# Patient Record
Sex: MALE | Race: WHITE | NOT HISPANIC OR LATINO | URBAN - METROPOLITAN AREA
[De-identification: names, ages, dates, MRNs, and addresses within clinical notes are randomized per-mention and may not be internally consistent; named-entity substitution may affect disease eponyms.]

---

## 2017-03-28 ENCOUNTER — INPATIENT (INPATIENT)
Facility: HOSPITAL | Age: 67
LOS: 0 days | Discharge: HOME | End: 2017-03-29
Attending: NEUROLOGICAL SURGERY

## 2017-06-27 DIAGNOSIS — Z88.5 ALLERGY STATUS TO NARCOTIC AGENT: ICD-10-CM

## 2017-06-27 DIAGNOSIS — Z88.6 ALLERGY STATUS TO ANALGESIC AGENT: ICD-10-CM

## 2017-06-27 DIAGNOSIS — E78.5 HYPERLIPIDEMIA, UNSPECIFIED: ICD-10-CM

## 2017-06-27 DIAGNOSIS — J44.9 CHRONIC OBSTRUCTIVE PULMONARY DISEASE, UNSPECIFIED: ICD-10-CM

## 2017-06-27 DIAGNOSIS — I67.1 CEREBRAL ANEURYSM, NONRUPTURED: ICD-10-CM

## 2017-08-07 ENCOUNTER — OUTPATIENT (OUTPATIENT)
Dept: OUTPATIENT SERVICES | Facility: HOSPITAL | Age: 67
LOS: 1 days | Discharge: HOME | End: 2017-08-07

## 2017-08-07 DIAGNOSIS — Z90.5 ACQUIRED ABSENCE OF KIDNEY: ICD-10-CM

## 2017-08-07 DIAGNOSIS — N18.3 CHRONIC KIDNEY DISEASE, STAGE 3 (MODERATE): ICD-10-CM

## 2017-08-07 DIAGNOSIS — E78.00 PURE HYPERCHOLESTEROLEMIA, UNSPECIFIED: ICD-10-CM

## 2017-08-07 DIAGNOSIS — I67.1 CEREBRAL ANEURYSM, NONRUPTURED: ICD-10-CM

## 2017-08-07 DIAGNOSIS — K21.9 GASTRO-ESOPHAGEAL REFLUX DISEASE WITHOUT ESOPHAGITIS: ICD-10-CM

## 2017-08-07 DIAGNOSIS — N28.1 CYST OF KIDNEY, ACQUIRED: ICD-10-CM

## 2017-09-13 ENCOUNTER — OUTPATIENT (OUTPATIENT)
Dept: OUTPATIENT SERVICES | Facility: HOSPITAL | Age: 67
LOS: 1 days | Discharge: HOME | End: 2017-09-13

## 2017-09-13 DIAGNOSIS — K21.9 GASTRO-ESOPHAGEAL REFLUX DISEASE WITHOUT ESOPHAGITIS: ICD-10-CM

## 2017-09-13 DIAGNOSIS — E78.00 PURE HYPERCHOLESTEROLEMIA, UNSPECIFIED: ICD-10-CM

## 2017-09-13 DIAGNOSIS — I67.1 CEREBRAL ANEURYSM, NONRUPTURED: ICD-10-CM

## 2017-09-13 DIAGNOSIS — R33.9 RETENTION OF URINE, UNSPECIFIED: ICD-10-CM

## 2017-09-15 ENCOUNTER — EMERGENCY (EMERGENCY)
Facility: HOSPITAL | Age: 67
LOS: 0 days | Discharge: HOME | End: 2017-09-15
Admitting: INTERNAL MEDICINE

## 2017-09-15 DIAGNOSIS — Z90.5 ACQUIRED ABSENCE OF KIDNEY: ICD-10-CM

## 2017-09-15 DIAGNOSIS — K21.9 GASTRO-ESOPHAGEAL REFLUX DISEASE WITHOUT ESOPHAGITIS: ICD-10-CM

## 2017-09-15 DIAGNOSIS — I67.1 CEREBRAL ANEURYSM, NONRUPTURED: ICD-10-CM

## 2017-09-15 DIAGNOSIS — R33.9 RETENTION OF URINE, UNSPECIFIED: ICD-10-CM

## 2017-09-15 DIAGNOSIS — Z87.891 PERSONAL HISTORY OF NICOTINE DEPENDENCE: ICD-10-CM

## 2017-09-15 DIAGNOSIS — Z88.5 ALLERGY STATUS TO NARCOTIC AGENT: ICD-10-CM

## 2017-09-15 DIAGNOSIS — E78.00 PURE HYPERCHOLESTEROLEMIA, UNSPECIFIED: ICD-10-CM

## 2017-09-15 DIAGNOSIS — I10 ESSENTIAL (PRIMARY) HYPERTENSION: ICD-10-CM

## 2017-09-21 ENCOUNTER — APPOINTMENT (OUTPATIENT)
Dept: UROLOGY | Facility: CLINIC | Age: 67
End: 2017-09-21
Payer: MEDICARE

## 2017-09-21 VITALS
SYSTOLIC BLOOD PRESSURE: 113 MMHG | WEIGHT: 134 LBS | DIASTOLIC BLOOD PRESSURE: 70 MMHG | BODY MASS INDEX: 22.33 KG/M2 | HEART RATE: 89 BPM | HEIGHT: 65 IN

## 2017-09-21 PROCEDURE — 99213 OFFICE O/P EST LOW 20 MIN: CPT

## 2017-09-28 ENCOUNTER — APPOINTMENT (OUTPATIENT)
Dept: UROLOGY | Facility: CLINIC | Age: 67
End: 2017-09-28
Payer: MEDICARE

## 2017-09-28 VITALS
HEART RATE: 81 BPM | HEIGHT: 65 IN | BODY MASS INDEX: 22.33 KG/M2 | WEIGHT: 134 LBS | DIASTOLIC BLOOD PRESSURE: 73 MMHG | SYSTOLIC BLOOD PRESSURE: 111 MMHG

## 2017-09-28 PROCEDURE — 99213 OFFICE O/P EST LOW 20 MIN: CPT

## 2017-11-10 ENCOUNTER — APPOINTMENT (OUTPATIENT)
Dept: UROLOGY | Facility: CLINIC | Age: 67
End: 2017-11-10
Payer: MEDICARE

## 2017-11-10 VITALS
SYSTOLIC BLOOD PRESSURE: 123 MMHG | HEART RATE: 76 BPM | BODY MASS INDEX: 22.33 KG/M2 | HEIGHT: 65 IN | DIASTOLIC BLOOD PRESSURE: 79 MMHG | WEIGHT: 134 LBS

## 2017-11-10 DIAGNOSIS — J42 UNSPECIFIED CHRONIC BRONCHITIS: ICD-10-CM

## 2017-11-10 DIAGNOSIS — N41.9 INFLAMMATORY DISEASE OF PROSTATE, UNSPECIFIED: ICD-10-CM

## 2017-11-10 PROCEDURE — 99213 OFFICE O/P EST LOW 20 MIN: CPT

## 2018-02-06 ENCOUNTER — OUTPATIENT (OUTPATIENT)
Dept: OUTPATIENT SERVICES | Facility: HOSPITAL | Age: 68
LOS: 1 days | Discharge: HOME | End: 2018-02-06

## 2018-02-06 DIAGNOSIS — R33.9 RETENTION OF URINE, UNSPECIFIED: ICD-10-CM

## 2018-02-06 DIAGNOSIS — N41.9 INFLAMMATORY DISEASE OF PROSTATE, UNSPECIFIED: ICD-10-CM

## 2018-02-22 ENCOUNTER — APPOINTMENT (OUTPATIENT)
Dept: UROLOGY | Facility: CLINIC | Age: 68
End: 2018-02-22
Payer: MEDICARE

## 2018-02-22 VITALS
SYSTOLIC BLOOD PRESSURE: 112 MMHG | BODY MASS INDEX: 21.99 KG/M2 | HEART RATE: 77 BPM | WEIGHT: 132 LBS | DIASTOLIC BLOOD PRESSURE: 68 MMHG | HEIGHT: 65 IN

## 2018-02-22 DIAGNOSIS — R97.20 ELEVATED PROSTATE, SPECIFIC ANTIGEN [PSA]: ICD-10-CM

## 2018-02-22 PROCEDURE — 99213 OFFICE O/P EST LOW 20 MIN: CPT

## 2018-02-22 RX ORDER — TAMSULOSIN HYDROCHLORIDE 0.4 MG/1
0.4 CAPSULE ORAL
Qty: 60 | Refills: 5 | Status: ACTIVE | COMMUNITY
Start: 2017-09-21

## 2018-05-24 ENCOUNTER — OUTPATIENT (OUTPATIENT)
Dept: OUTPATIENT SERVICES | Facility: HOSPITAL | Age: 68
LOS: 1 days | Discharge: HOME | End: 2018-05-24

## 2018-05-24 ENCOUNTER — APPOINTMENT (OUTPATIENT)
Dept: UROLOGY | Facility: CLINIC | Age: 68
End: 2018-05-24

## 2018-05-24 DIAGNOSIS — N63.20 UNSPECIFIED LUMP IN THE LEFT BREAST, UNSPECIFIED QUADRANT: ICD-10-CM

## 2018-05-30 DIAGNOSIS — N62 HYPERTROPHY OF BREAST: ICD-10-CM

## 2018-08-07 ENCOUNTER — LABORATORY RESULT (OUTPATIENT)
Age: 68
End: 2018-08-07

## 2018-08-07 ENCOUNTER — OUTPATIENT (OUTPATIENT)
Dept: OUTPATIENT SERVICES | Facility: HOSPITAL | Age: 68
LOS: 1 days | Discharge: HOME | End: 2018-08-07

## 2018-08-07 DIAGNOSIS — N41.9 INFLAMMATORY DISEASE OF PROSTATE, UNSPECIFIED: ICD-10-CM

## 2018-08-07 DIAGNOSIS — Z00.00 ENCOUNTER FOR GENERAL ADULT MEDICAL EXAMINATION WITHOUT ABNORMAL FINDINGS: ICD-10-CM

## 2018-08-07 DIAGNOSIS — R97.20 ELEVATED PROSTATE SPECIFIC ANTIGEN [PSA]: ICD-10-CM

## 2018-08-07 DIAGNOSIS — R33.9 RETENTION OF URINE, UNSPECIFIED: ICD-10-CM

## 2018-08-18 ENCOUNTER — EMERGENCY (EMERGENCY)
Facility: HOSPITAL | Age: 68
LOS: 0 days | Discharge: HOME | End: 2018-08-19
Attending: EMERGENCY MEDICINE | Admitting: EMERGENCY MEDICINE

## 2018-08-18 VITALS
SYSTOLIC BLOOD PRESSURE: 117 MMHG | HEART RATE: 88 BPM | WEIGHT: 132.06 LBS | RESPIRATION RATE: 18 BRPM | DIASTOLIC BLOOD PRESSURE: 85 MMHG | HEIGHT: 65 IN | OXYGEN SATURATION: 95 % | TEMPERATURE: 97 F
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
## 2018-08-18 DIAGNOSIS — M79.645 PAIN IN LEFT FINGER(S): ICD-10-CM

## 2018-08-18 DIAGNOSIS — M79.89 OTHER SPECIFIED SOFT TISSUE DISORDERS: ICD-10-CM

## 2018-08-18 DIAGNOSIS — F17.200 NICOTINE DEPENDENCE, UNSPECIFIED, UNCOMPLICATED: ICD-10-CM
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
## 2018-08-18 DIAGNOSIS — Z88.5 ALLERGY STATUS TO NARCOTIC AGENT: ICD-10-CM

## 2018-08-18 DIAGNOSIS — M79.646 PAIN IN UNSPECIFIED FINGER(S): ICD-10-CM

## 2018-08-18 DIAGNOSIS — Z79.899 OTHER LONG TERM (CURRENT) DRUG THERAPY: ICD-10-CM
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
## 2018-08-18 NOTE — ED ADULT NURSE NOTE - NSSISCREENINGQ3_ED_A_ED
Detail Level: Detailed Duration Of Freeze Thaw-Cycle (Seconds): 0 Post-Care Instructions: I reviewed with the patient in detail post-care instructions. Patient is to wear sunprotection, and avoid picking at any of the treated lesions. Pt may apply Vaseline to crusted or scabbing areas. Consent: The patient's consent was obtained including but not limited to risks of crusting, scabbing, blistering, scarring, darker or lighter pigmentary change, recurrence, incomplete removal and infection. Render Post-Care Instructions In Note?: no No

## 2018-08-18 NOTE — ED PROVIDER NOTE - NS ED ROS FT
Review of Systems:  	•	CONSTITUTIONAL - no fever, no diaphoresis, no chills  	•	SKIN - no rash  	•	HEMATOLOGIC - no bleeding, no bruising  	•	MUSCULOSKELETAL - + joint paint, + swelling, no redness  	•	NEUROLOGIC - no weakness, no paresthesias,

## 2018-08-18 NOTE — ED PROVIDER NOTE - OBJECTIVE STATEMENT
66 yo M with pmhx of brain aneurysm, kidney cancer s/p left nephrectomy presenting with pain and swelling to left 4th digit of hand after having a car battery fall onto hand earlier today. Patient reports pain with palpation and movement. No redness, nausea, vomiting, dizziness, paresthesias, or weakness.

## 2018-08-18 NOTE — ED ADULT NURSE NOTE - NSIMPLEMENTINTERV_GEN_ALL_ED
Implemented All Universal Safety Interventions:  [place] to call system. Call bell, personal items and telephone within reach. Instruct patient to call for assistance. Room bathroom lighting operational. Non-slip footwear when patient is off stretcher. Physically safe environment: no spills, clutter or unnecessary equipment. Stretcher in lowest position, wheels locked, appropriate side rails in place.

## 2018-08-18 NOTE — ED PROVIDER NOTE - ATTENDING CONTRIBUTION TO CARE
I have personally performed a history and physical exam on this patient and personally directed the management of the patient. Patient presents for evaluation of left 5th finger pain after dropping a car battery on his hand pain is moderate and throbbing in nature worse with movement.  On physical exam no lacerations noted from.  we obtained an xray which Is negative for fracture based on clinical exam he was placed in an ulnar gutter splint and discharged.

## 2018-08-18 NOTE — ED PROVIDER NOTE - PHYSICAL EXAMINATION
VITAL SIGNS: I have reviewed nursing notes and confirm.  CONSTITUTIONAL: Well-developed; well-nourished; in no acute distress.  SKIN: Skin exam is warm and dry, no acute rash.  EXT: +Tenderness to left 5th digit of hand with swelling. No bony deformities. Decreased ROM due to pain however full passive ROM.   NEURO: Alert, oriented. Grossly unremarkable. No focal deficits. Radial pulses 2+ bilaterally.   PSYCH: Cooperative, appropriate.

## 2018-08-23 ENCOUNTER — APPOINTMENT (OUTPATIENT)
Dept: UROLOGY | Facility: CLINIC | Age: 68
End: 2018-08-23
Payer: MEDICARE

## 2018-08-23 VITALS
HEART RATE: 86 BPM | SYSTOLIC BLOOD PRESSURE: 117 MMHG | WEIGHT: 132 LBS | DIASTOLIC BLOOD PRESSURE: 75 MMHG | BODY MASS INDEX: 21.99 KG/M2 | HEIGHT: 65 IN

## 2018-08-23 PROCEDURE — 99213 OFFICE O/P EST LOW 20 MIN: CPT

## 2018-08-23 NOTE — PHYSICAL EXAM
[Abdomen Soft] : soft [Urethral Meatus] : meatus normal [Urinary Bladder Findings] : the bladder was normal on palpation [Scrotum] : the scrotum was normal [Testes Mass (___cm)] : there were no testicular masses [No Prostate Nodules] : no prostate nodules [Skin Color & Pigmentation] : normal skin color and pigmentation [Heart Rate And Rhythm] : Heart rate and rhythm were normal [] : no respiratory distress [Oriented To Time, Place, And Person] : oriented to person, place, and time [Not Anxious] : not anxious [Normal Station and Gait] : the gait and station were normal for the patient's age [No Focal Deficits] : no focal deficits [No Palpable Adenopathy] : no palpable adenopathy [FreeTextEntry1] : dwarfism

## 2018-08-23 NOTE — ASSESSMENT
[FreeTextEntry1] : 69 yo with incomplete emptying- \par \par - PSA in 6 months\par - urine studies\par - renal and bladder u/s

## 2018-08-23 NOTE — LETTER BODY
[Dear  ___] : Dear  [unfilled], [Consult Letter:] : I had the pleasure of evaluating your patient, [unfilled]. [Please see my note below.] : Please see my note below. [Consult Closing:] : Thank you very much for allowing me to participate in the care of this patient.  If you have any questions, please do not hesitate to contact me. [Sincerely,] : Sincerely, [FreeTextEntry3] : Justino Medrano MD, FACS\par

## 2018-08-23 NOTE — HISTORY OF PRESENT ILLNESS
[None] : no symptoms [FreeTextEntry1] : 69 yo with elevated PSA and BPH\par \par on finasteride, Bethanechol and tamsulosin\par \par PSA don 8/2018 was 1.57 (corrects to 3.14)\par \par recently noted ejaculation with intercourse\par no discoloration

## 2018-08-24 PROBLEM — I67.1 CEREBRAL ANEURYSM, NONRUPTURED: Chronic | Status: ACTIVE | Noted: 2018-08-18

## 2018-08-24 PROBLEM — C80.1 MALIGNANT (PRIMARY) NEOPLASM, UNSPECIFIED: Chronic | Status: ACTIVE | Noted: 2018-08-18

## 2018-08-24 PROBLEM — N42.9 DISORDER OF PROSTATE, UNSPECIFIED: Chronic | Status: ACTIVE | Noted: 2018-08-18

## 2019-01-29 ENCOUNTER — LABORATORY RESULT (OUTPATIENT)
Age: 69
End: 2019-01-29

## 2019-01-29 ENCOUNTER — OUTPATIENT (OUTPATIENT)
Dept: OUTPATIENT SERVICES | Facility: HOSPITAL | Age: 69
LOS: 1 days | Discharge: HOME | End: 2019-01-29

## 2019-01-29 DIAGNOSIS — R97.20 ELEVATED PROSTATE SPECIFIC ANTIGEN [PSA]: ICD-10-CM

## 2019-01-29 DIAGNOSIS — N40.1 BENIGN PROSTATIC HYPERPLASIA WITH LOWER URINARY TRACT SYMPTOMS: ICD-10-CM

## 2019-02-01 LAB — BACTERIA UR CULT: ABNORMAL

## 2019-02-03 ENCOUNTER — FORM ENCOUNTER (OUTPATIENT)
Age: 69
End: 2019-02-03

## 2019-02-04 ENCOUNTER — OUTPATIENT (OUTPATIENT)
Dept: OUTPATIENT SERVICES | Facility: HOSPITAL | Age: 69
LOS: 1 days | Discharge: HOME | End: 2019-02-04

## 2019-02-04 DIAGNOSIS — R33.9 RETENTION OF URINE, UNSPECIFIED: ICD-10-CM

## 2019-02-04 DIAGNOSIS — N40.1 BENIGN PROSTATIC HYPERPLASIA WITH LOWER URINARY TRACT SYMPTOMS: ICD-10-CM

## 2019-02-04 DIAGNOSIS — R97.20 ELEVATED PROSTATE SPECIFIC ANTIGEN [PSA]: ICD-10-CM

## 2019-02-21 ENCOUNTER — APPOINTMENT (OUTPATIENT)
Dept: UROLOGY | Facility: CLINIC | Age: 69
End: 2019-02-21
Payer: MEDICARE

## 2019-02-21 DIAGNOSIS — R33.9 RETENTION OF URINE, UNSPECIFIED: ICD-10-CM

## 2019-02-21 DIAGNOSIS — R97.20 ELEVATED PROSTATE, SPECIFIC ANTIGEN [PSA]: ICD-10-CM

## 2019-02-21 PROCEDURE — 99213 OFFICE O/P EST LOW 20 MIN: CPT

## 2019-02-21 RX ORDER — AMOXICILLIN AND CLAVULANATE POTASSIUM 875; 125 MG/1; MG/1
875-125 TABLET, COATED ORAL
Qty: 28 | Refills: 0 | Status: COMPLETED | COMMUNITY
Start: 2017-11-10 | End: 2019-02-21

## 2019-02-22 NOTE — HISTORY OF PRESENT ILLNESS
[None] : None [FreeTextEntry1] : Familia is a 68-year-old male who we have been following for elevated PSA and BPH. Currently, he is managed on finasteride, tamsulosin, and bethanechol with good efficacy and without adverse events.\par \par His most recent PSA, from January 2019, is 1.32 (2.64 corrected) and improved from prior values.\par \par During testing revealed group beta strep without a positive urinalysis. This is currently asymptomatic.\par \par Renal/bladder sonogram, from February 2019, revealed unremarkable right kidney with elevated postvoid residual of 146 cc, and consistent with prior values.\par \par Patient is voiding well however, he is complaining of dry ejaculations that occurred after a traumatic catheterization.

## 2019-02-22 NOTE — ASSESSMENT
[FreeTextEntry1] : This is a 68-year-old male with history of elevated postvoid residual and elevated PSA. Currently he is voiding well on what finasteride and bethanechol. He'll continue therapy and followup in 6 months for review.\par \par Concerning his inability to ejaculate posttraumatic catheterization, I explained there is not much that can be done at this time. We reviewed some medications that may help ejaculation including Benadryl and pseudoephedrine however, he understands that these medications could worsen his urinary retention. \par \par At this time, is elected for observation and will follow up in 6 months for TRUS with prostate for further evaluation, if this is still an issue in 6 months.

## 2019-02-22 NOTE — ADDENDUM
[FreeTextEntry1] : I have seen and Evaluated the patient with AUBREY Quintero\par I agree with the content of his progress note and the plan of care outlined\par \par

## 2019-03-19 ENCOUNTER — OUTPATIENT (OUTPATIENT)
Dept: OUTPATIENT SERVICES | Facility: HOSPITAL | Age: 69
LOS: 1 days | Discharge: HOME | End: 2019-03-19

## 2019-03-19 DIAGNOSIS — Z01.818 ENCOUNTER FOR OTHER PREPROCEDURAL EXAMINATION: ICD-10-CM

## 2019-08-24 ENCOUNTER — OUTPATIENT (OUTPATIENT)
Dept: OUTPATIENT SERVICES | Facility: HOSPITAL | Age: 69
LOS: 1 days | Discharge: HOME | End: 2019-08-24

## 2019-08-24 ENCOUNTER — LABORATORY RESULT (OUTPATIENT)
Age: 69
End: 2019-08-24

## 2019-08-24 DIAGNOSIS — R97.20 ELEVATED PROSTATE SPECIFIC ANTIGEN [PSA]: ICD-10-CM

## 2019-08-26 ENCOUNTER — APPOINTMENT (OUTPATIENT)
Dept: UROLOGY | Facility: CLINIC | Age: 69
End: 2019-08-26
Payer: MEDICARE

## 2019-08-26 VITALS — WEIGHT: 132 LBS | BODY MASS INDEX: 21.99 KG/M2 | HEIGHT: 65 IN

## 2019-08-26 PROCEDURE — 76872 US TRANSRECTAL: CPT

## 2019-11-18 ENCOUNTER — EMERGENCY (EMERGENCY)
Facility: HOSPITAL | Age: 69
LOS: 0 days | Discharge: HOME | End: 2019-11-18
Attending: EMERGENCY MEDICINE | Admitting: EMERGENCY MEDICINE
Payer: MEDICARE

## 2019-11-18 VITALS
HEART RATE: 87 BPM | WEIGHT: 132.06 LBS | DIASTOLIC BLOOD PRESSURE: 76 MMHG | HEIGHT: 65 IN | TEMPERATURE: 96 F | RESPIRATION RATE: 16 BRPM | SYSTOLIC BLOOD PRESSURE: 116 MMHG
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
## 2019-11-18 DIAGNOSIS — Z88.5 ALLERGY STATUS TO NARCOTIC AGENT: ICD-10-CM
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
## 2019-11-18 DIAGNOSIS — M79.603 PAIN IN ARM, UNSPECIFIED: ICD-10-CM

## 2019-11-18 DIAGNOSIS — M25.511 PAIN IN RIGHT SHOULDER: ICD-10-CM

## 2019-11-18 PROCEDURE — 73030 X-RAY EXAM OF SHOULDER: CPT | Mod: 26,RT

## 2019-11-18 PROCEDURE — 73060 X-RAY EXAM OF HUMERUS: CPT | Mod: 26,RT

## 2019-11-18 PROCEDURE — 99283 EMERGENCY DEPT VISIT LOW MDM: CPT

## 2019-11-18 NOTE — ED PROVIDER NOTE - NS ED ROS FT
Constitutional: no fever, chills, no recent weight loss, change in appetite or malaise  Cardiac: No chest pain, SOB or edema.  Respiratory: No cough or respiratory distress  GI: No nausea, vomiting, diarrhea or abdominal pain.  : No dysuria, frequency, urgency or hematuria  MS: + rt shoulder pain with radiatio to rt bicep. no loss of ROM, no weakness  Neuro: No headache or weakness. No LOC.  Skin: No skin rash.  Except as documented in the HPI, all other systems are negative.

## 2019-11-18 NOTE — ED PROVIDER NOTE - ATTENDING CONTRIBUTION TO CARE
69 yr old male hx of chronic artthris right shoulder here for right arm pain. pt reports was holding/lifting heavy piece of furniture. pt felt pain suddently to right bicep. pain worse with mvt no weakness, numbess. on exam full right elbow, no swelling to right upper arm no skin changes right upper arm

## 2019-11-18 NOTE — ED PROVIDER NOTE - CARE PROVIDER_API CALL
Familia Cavazos (MD)  Orthopaedic Surgery  [address] [place] [address]  Phone: [phone]  Fax: [phone]  Follow Up Time:

## 2019-11-18 NOTE — ED PROVIDER NOTE - PATIENT PORTAL LINK FT
You can access the FollowMyHealth Patient Portal offered by [facility] by registering at the following website: [email or web address]. By joining [facility]’s FollowMyHealth portal, you will also be able to view your health information using other applications (apps) compatible with our system.

## 2019-11-18 NOTE — ED PROVIDER NOTE - OBJECTIVE STATEMENT
69 year old M c/o rt sided shoulder pain after trying to move heavy dresser at home. Pain radiates to rt bicep and worse with moving/lifting, better with rest. No other injuries. No decreased rom, decreased sensation, paresthesias, neck pain, headache, skin changes/bruising, redness.

## 2019-11-18 NOTE — ED PROVIDER NOTE - PHYSICAL EXAMINATION
CONSTITUTIONAL: Well-appearing; well-nourished; in no apparent distress.   EYES: PERRL; EOM intact.   NECK: No c spine ttp  CARDIOVASCULAR: Normal S1, S2; no murmurs, rubs, or gallops.   RESPIRATORY: Normal chest excursion with respiration; breath sounds clear and equal bilaterally; no wheezes, rhonchi, or rales.  GI/: Normal bowel sounds; non-distended; non-tender; no palpable organomegaly.   MS: No evidence of trauma or deformity. Full rom of b/l shoulders. Strength equal b/l 5/5 throughout. Radial pulses intact  SKIN: Normal for age and race; warm; dry; good turgor; no apparent lesions or exudate.   NEURO/PSYCH: A & O x 4; grossly unremarkable. mood and manner are appropriate. Grooming and personal hygiene are appropriate. Neurovascular intact. Sensation intact

## 2019-11-18 NOTE — ED ADULT TRIAGE NOTE - CHIEF COMPLAINT QUOTE
Right shoulder and arm pain after moving a furniture happened an hour  and a half ago. Pt. took Advil before coming here

## 2020-02-05 LAB
PSA FREE FLD-MCNC: 27 %
PSA FREE SERPL-MCNC: 0.3 NG/ML
PSA SERPL-MCNC: 1.1 NG/ML

## 2020-02-27 ENCOUNTER — APPOINTMENT (OUTPATIENT)
Dept: UROLOGY | Facility: CLINIC | Age: 70
End: 2020-02-27
Payer: MEDICARE

## 2020-02-27 DIAGNOSIS — N52.9 MALE ERECTILE DYSFUNCTION, UNSPECIFIED: ICD-10-CM

## 2020-02-27 PROCEDURE — 99214 OFFICE O/P EST MOD 30 MIN: CPT

## 2020-02-27 NOTE — PHYSICAL EXAM
[General Appearance - Well Developed] : well developed [General Appearance - Well Nourished] : well nourished [Normal Appearance] : normal appearance [Well Groomed] : well groomed [General Appearance - In No Acute Distress] : no acute distress [Edema] : no peripheral edema [] : no respiratory distress [Respiration, Rhythm And Depth] : normal respiratory rhythm and effort [Oriented To Time, Place, And Person] : oriented to person, place, and time [Exaggerated Use Of Accessory Muscles For Inspiration] : no accessory muscle use [Mood] : the mood was normal [Not Anxious] : not anxious [Affect] : the affect was normal [Normal Station and Gait] : the gait and station were normal for the patient's age [No Focal Deficits] : no focal deficits

## 2020-02-27 NOTE — HISTORY OF PRESENT ILLNESS
[None] : None [FreeTextEntry1] : 69-year-old male who we have been following for elevated PSA and BPH. Currently, he is managed on finasteride, tamsulosin, and bethanechol with good efficacy and without adverse events.\par \par His most recent PSA, from Feb 2020 , is 1.10 (2.2 corrected) and improved from prior values.\par \par Renal/bladder sonogram, from February 2019, revealed unremarkable right kidney with elevated postvoid residual of 146 cc, and consistent with prior values.\par \par Patient is voiding well however, he is complaining of dry ejaculations that occurred after a traumatic catheterization.

## 2020-02-27 NOTE — ASSESSMENT
[FreeTextEntry1] : This is a 69-year-old male with history of elevated postvoid residual and elevated PSA. Currently he is voiding well on what finasteride and bethanechol. He'll continue therapy and followup in 6 months for review.\par \par - cialis 5 mg po prn (full R+B explained\par no contraindications)\par - renal and bladder US\par - f/u in 6 months (will review and consider stopping finasteride)

## 2020-06-08 ENCOUNTER — LABORATORY RESULT (OUTPATIENT)
Age: 70
End: 2020-06-08

## 2020-06-29 ENCOUNTER — RX RENEWAL (OUTPATIENT)
Age: 70
End: 2020-06-29

## 2020-07-23 ENCOUNTER — RX RENEWAL (OUTPATIENT)
Age: 70
End: 2020-07-23

## 2020-08-27 ENCOUNTER — APPOINTMENT (OUTPATIENT)
Dept: UROLOGY | Facility: CLINIC | Age: 70
End: 2020-08-27
Payer: MEDICARE

## 2020-08-27 VITALS — WEIGHT: 132 LBS | HEIGHT: 65 IN | BODY MASS INDEX: 21.99 KG/M2 | TEMPERATURE: 98.5 F

## 2020-08-27 PROCEDURE — 99213 OFFICE O/P EST LOW 20 MIN: CPT

## 2020-09-01 NOTE — ASSESSMENT
[FreeTextEntry1] : This is a 70-year-old male with history of elevated postvoid residual and elevated PSA. Currently he is voiding well on flomax and bethanechol. he had been on finasteride but we stoppped it today - erectile dysfunction\par He'll continue therapy and followup in 6 months for review.\par \par - cialis 10 mg po prn (full R+B explained no contraindications)\par - renal and bladder US\par - f/u in 6 months erlin aguilera   daughter

## 2020-09-01 NOTE — HISTORY OF PRESENT ILLNESS
[FreeTextEntry1] : 70-year-old male who we have been following for elevated PSA and BPH. Currently, he is managed on finasteride, tamsulosin, and bethanechol with good efficacy and without adverse events.\par \par His most recent PSA, 6/2020 1.67 (3.34 corrects)\par \par from Feb 2020 , is 1.10 (2.2 corrected) and improved from prior values.\par \par Renal/bladder sonogram, from February 2019, revealed unremarkable right kidney with elevated postvoid residual of 146 cc, and consistent with prior values.\par \par Patient is voiding well however, he is complaining of dry ejaculations that occurred after a traumatic catheterization. [None] : None

## 2020-09-14 RX ORDER — TADALAFIL 5 MG/1
5 TABLET ORAL
Qty: 6 | Refills: 0 | Status: ACTIVE | COMMUNITY
Start: 2020-02-27 | End: 1900-01-01

## 2020-10-21 RX ORDER — TADALAFIL 5 MG/1
5 TABLET ORAL
Qty: 30 | Refills: 5 | Status: ACTIVE | COMMUNITY
Start: 2020-10-21 | End: 1900-01-01

## 2020-12-04 ENCOUNTER — LABORATORY RESULT (OUTPATIENT)
Age: 70
End: 2020-12-04

## 2020-12-06 ENCOUNTER — OUTPATIENT (OUTPATIENT)
Dept: OUTPATIENT SERVICES | Facility: HOSPITAL | Age: 70
LOS: 1 days | Discharge: HOME | End: 2020-12-06
Payer: MEDICARE

## 2020-12-06 DIAGNOSIS — R97.20 ELEVATED PROSTATE SPECIFIC ANTIGEN [PSA]: ICD-10-CM

## 2020-12-06 DIAGNOSIS — R33.9 RETENTION OF URINE, UNSPECIFIED: ICD-10-CM

## 2020-12-06 PROCEDURE — 76770 US EXAM ABDO BACK WALL COMP: CPT | Mod: 26

## 2021-02-25 ENCOUNTER — APPOINTMENT (OUTPATIENT)
Dept: UROLOGY | Facility: CLINIC | Age: 71
End: 2021-02-25
Payer: MEDICARE

## 2021-02-25 VITALS — HEIGHT: 65 IN | BODY MASS INDEX: 21.99 KG/M2 | TEMPERATURE: 97.9 F | WEIGHT: 132 LBS

## 2021-02-25 PROCEDURE — 99214 OFFICE O/P EST MOD 30 MIN: CPT

## 2021-02-25 PROCEDURE — 99072 ADDL SUPL MATRL&STAF TM PHE: CPT

## 2021-02-28 NOTE — LETTER BODY
[Dear  ___] : Dear  [unfilled], [Consult Letter:] : I had the pleasure of evaluating your patient, [unfilled]. [Please see my note below.] : Please see my note below. [Sincerely,] : Sincerely, [FreeTextEntry3] : Justino Medrano MD, FACS\par

## 2021-02-28 NOTE — ASSESSMENT
[FreeTextEntry1] : 70-year-old male with history of elevated postvoid residual and elevated PSA. \par Currently he is voiding well on what finasteride and bethanechol. \par \par He'll continue therapy and followup in 6 months for review.\par \par - cialis 5 mg po prn (full R+B explained\par no contraindications) - reordered\par - renal and bladder US reviewed\par - f/u in 12 months (will review and consider stopping finasteride)\par - PSA in 1 year

## 2021-02-28 NOTE — HISTORY OF PRESENT ILLNESS
[None] : None [FreeTextEntry1] : 70-year-old male who we have been following for elevated PSA and BPH. Currently, he is managed on finasteride, tamsulosin, and bethanechol with good efficacy and without adverse events.\par \par His most recent PSA, 12/2020 \par 1.31 ng/ml with 33% \par \par from Feb 2020 , is 1.10 (2.2 corrected) and improved from prior values.\par \par Renal and Bladder US 12/2020\par s/p LEFT Nephrectomy\par  ml\par prostate 26 cc\par \par Renal/bladder sonogram, from February 2019, revealed unremarkable right kidney with elevated postvoid residual of 146 cc, and consistent with prior values.\par \par Patient is voiding well however, he is complaining of dry ejaculations that occurred after a traumatic catheterization.

## 2021-06-05 ENCOUNTER — RX RENEWAL (OUTPATIENT)
Age: 71
End: 2021-06-05

## 2021-09-15 ENCOUNTER — APPOINTMENT (OUTPATIENT)
Dept: NEUROSURGERY | Facility: CLINIC | Age: 71
End: 2021-09-15
Payer: MEDICARE

## 2021-09-15 ENCOUNTER — OUTPATIENT (OUTPATIENT)
Dept: OUTPATIENT SERVICES | Facility: HOSPITAL | Age: 71
LOS: 1 days | Discharge: HOME | End: 2021-09-15
Payer: MEDICARE

## 2021-09-15 VITALS
WEIGHT: 130.07 LBS | OXYGEN SATURATION: 98 % | RESPIRATION RATE: 16 BRPM | HEIGHT: 65 IN | TEMPERATURE: 98 F | HEART RATE: 80 BPM | DIASTOLIC BLOOD PRESSURE: 76 MMHG | SYSTOLIC BLOOD PRESSURE: 126 MMHG

## 2021-09-15 VITALS — WEIGHT: 126 LBS | BODY MASS INDEX: 20.99 KG/M2 | HEIGHT: 65 IN

## 2021-09-15 DIAGNOSIS — Z01.818 ENCOUNTER FOR OTHER PREPROCEDURAL EXAMINATION: ICD-10-CM

## 2021-09-15 DIAGNOSIS — Z90.79 ACQUIRED ABSENCE OF OTHER GENITAL ORGAN(S): Chronic | ICD-10-CM

## 2021-09-15 DIAGNOSIS — I67.1 CEREBRAL ANEURYSM, NONRUPTURED: ICD-10-CM

## 2021-09-15 DIAGNOSIS — Z98.890 OTHER SPECIFIED POSTPROCEDURAL STATES: Chronic | ICD-10-CM

## 2021-09-15 DIAGNOSIS — Z90.49 ACQUIRED ABSENCE OF OTHER SPECIFIED PARTS OF DIGESTIVE TRACT: Chronic | ICD-10-CM

## 2021-09-15 DIAGNOSIS — N18.9 CHRONIC KIDNEY DISEASE, UNSPECIFIED: ICD-10-CM

## 2021-09-15 DIAGNOSIS — Z90.5 ACQUIRED ABSENCE OF KIDNEY: Chronic | ICD-10-CM

## 2021-09-15 LAB
ALBUMIN SERPL ELPH-MCNC: 4.1 G/DL — SIGNIFICANT CHANGE UP (ref 3.5–5.2)
ALP SERPL-CCNC: 104 U/L — SIGNIFICANT CHANGE UP (ref 30–115)
ALT FLD-CCNC: 15 U/L — SIGNIFICANT CHANGE UP (ref 0–41)
ANION GAP SERPL CALC-SCNC: 10 MMOL/L — SIGNIFICANT CHANGE UP (ref 7–14)
APTT BLD: 32.1 SEC — SIGNIFICANT CHANGE UP (ref 27–39.2)
AST SERPL-CCNC: 22 U/L — SIGNIFICANT CHANGE UP (ref 0–41)
BASOPHILS # BLD AUTO: 0.03 K/UL — SIGNIFICANT CHANGE UP (ref 0–0.2)
BASOPHILS NFR BLD AUTO: 0.4 % — SIGNIFICANT CHANGE UP (ref 0–1)
BILIRUB SERPL-MCNC: 0.3 MG/DL — SIGNIFICANT CHANGE UP (ref 0.2–1.2)
BUN SERPL-MCNC: 17 MG/DL — SIGNIFICANT CHANGE UP (ref 10–20)
CALCIUM SERPL-MCNC: 9.5 MG/DL — SIGNIFICANT CHANGE UP (ref 8.5–10.1)
CHLORIDE SERPL-SCNC: 104 MMOL/L — SIGNIFICANT CHANGE UP (ref 98–110)
CO2 SERPL-SCNC: 26 MMOL/L — SIGNIFICANT CHANGE UP (ref 17–32)
CREAT ?TM UR-MCNC: 73 MG/DL — SIGNIFICANT CHANGE UP
CREAT SERPL-MCNC: 1.3 MG/DL — SIGNIFICANT CHANGE UP (ref 0.7–1.5)
EOSINOPHIL # BLD AUTO: 0.31 K/UL — SIGNIFICANT CHANGE UP (ref 0–0.7)
EOSINOPHIL NFR BLD AUTO: 4 % — SIGNIFICANT CHANGE UP (ref 0–8)
GLUCOSE SERPL-MCNC: 59 MG/DL — LOW (ref 70–99)
HCT VFR BLD CALC: 43 % — SIGNIFICANT CHANGE UP (ref 42–52)
HGB BLD-MCNC: 13.8 G/DL — LOW (ref 14–18)
IMM GRANULOCYTES NFR BLD AUTO: 0.3 % — SIGNIFICANT CHANGE UP (ref 0.1–0.3)
INR BLD: 0.93 RATIO — SIGNIFICANT CHANGE UP (ref 0.65–1.3)
LYMPHOCYTES # BLD AUTO: 1.98 K/UL — SIGNIFICANT CHANGE UP (ref 1.2–3.4)
LYMPHOCYTES # BLD AUTO: 25.3 % — SIGNIFICANT CHANGE UP (ref 20.5–51.1)
MCHC RBC-ENTMCNC: 28.7 PG — SIGNIFICANT CHANGE UP (ref 27–31)
MCHC RBC-ENTMCNC: 32.1 G/DL — SIGNIFICANT CHANGE UP (ref 32–37)
MCV RBC AUTO: 89.4 FL — SIGNIFICANT CHANGE UP (ref 80–94)
MONOCYTES # BLD AUTO: 0.78 K/UL — HIGH (ref 0.1–0.6)
MONOCYTES NFR BLD AUTO: 9.9 % — HIGH (ref 1.7–9.3)
NEUTROPHILS # BLD AUTO: 4.72 K/UL — SIGNIFICANT CHANGE UP (ref 1.4–6.5)
NEUTROPHILS NFR BLD AUTO: 60.1 % — SIGNIFICANT CHANGE UP (ref 42.2–75.2)
NRBC # BLD: 0 /100 WBCS — SIGNIFICANT CHANGE UP (ref 0–0)
PLATELET # BLD AUTO: 277 K/UL — SIGNIFICANT CHANGE UP (ref 130–400)
POTASSIUM SERPL-MCNC: 4.4 MMOL/L — SIGNIFICANT CHANGE UP (ref 3.5–5)
POTASSIUM SERPL-SCNC: 4.4 MMOL/L — SIGNIFICANT CHANGE UP (ref 3.5–5)
PROT ?TM UR-MCNC: 16 MG/DLG/24H — SIGNIFICANT CHANGE UP
PROT SERPL-MCNC: 7.8 G/DL — SIGNIFICANT CHANGE UP (ref 6–8)
PROT/CREAT UR-RTO: 0.2 RATIO — SIGNIFICANT CHANGE UP (ref 0–0.2)
PROTHROM AB SERPL-ACNC: 10.7 SEC — SIGNIFICANT CHANGE UP (ref 9.95–12.87)
RBC # BLD: 4.81 M/UL — SIGNIFICANT CHANGE UP (ref 4.7–6.1)
RBC # FLD: 14.1 % — SIGNIFICANT CHANGE UP (ref 11.5–14.5)
SODIUM SERPL-SCNC: 140 MMOL/L — SIGNIFICANT CHANGE UP (ref 135–146)
WBC # BLD: 7.84 K/UL — SIGNIFICANT CHANGE UP (ref 4.8–10.8)
WBC # FLD AUTO: 7.84 K/UL — SIGNIFICANT CHANGE UP (ref 4.8–10.8)

## 2021-09-15 PROCEDURE — 99203 OFFICE O/P NEW LOW 30 MIN: CPT

## 2021-09-15 PROCEDURE — 93010 ELECTROCARDIOGRAM REPORT: CPT

## 2021-09-15 RX ORDER — ERGOCALCIFEROL 1.25 MG/1
0 CAPSULE ORAL
Qty: 0 | Refills: 0 | DISCHARGE

## 2021-09-15 RX ORDER — FINASTERIDE 5 MG/1
1 TABLET, FILM COATED ORAL
Qty: 0 | Refills: 0 | DISCHARGE

## 2021-09-15 RX ORDER — ATORVASTATIN CALCIUM 80 MG/1
1 TABLET, FILM COATED ORAL
Qty: 0 | Refills: 0 | DISCHARGE

## 2021-09-15 NOTE — H&P PST ADULT - REASON FOR ADMISSION
70 yo male presents for PAST in preparation for diagnostic cerebral angiogram on 10/1/2021 under [facility] by Dr. Miranda (IR).

## 2021-09-15 NOTE — HISTORY OF PRESENT ILLNESS
[de-identified] : This pleasant 71 year old gentleman presents for an evaluation for his unruptured right sided MCA aneurysm. He has a past history for a clipping of an unruptured right sided MCA aneurysm along the M1 segment approximately 18 years ago. At that time he was noted to have a fusiform type aneurysm along the right sided M2 bifurcation and this was not addressed. He has been followed up for this aneurysm since then with non-invasive imaging in the form of CTA survellance. I was able to review most of these images in the forms of scans or reports, with the 2005 CTA demonstrating the aneurysm of a smaller size of approximately 5-6 mm to now measuring approximately 10mm in maximum diameter on his most recent imaging. He also has a small fusiform left sided MCA bifurcation aneurysm which is not ruptured. He has a single kidney from prior nephrectomy, is a long standing smoker, reports no history of hypertension, has prostatic hypertrophy and denies a family history of brain aneurysms.\par \par Today we spent a long time going over his imaging and the natural history of his aneurysm and cerebral aneurysms in general. He is very concerned about this aneurysm given that he recently found out that the size has increased over time. I tried to put this in to perspective for him in terms of the slow growth and overall low risk of rupture in the immediate future but he still remains concerned. We discussed performing a catheter angiogram to evaluate this aneurysm in more detail. I explained that the CT appearances were of a fusiform bulging of the MCA M2 bifurcation region, with a larger saccular type secondary aneurysm coming off this region. A catheter angiogram would help elucidate this better. We went over the risks of a catheter angiogram  including to his renal function, and stroke, bleeding, groin complications. We will optimize his renal functioning and proceed accordingly.

## 2021-09-15 NOTE — H&P PST ADULT - NSICDXPASTMEDICALHX_GEN_ALL_CORE_FT
PAST MEDICAL HISTORY:  Brain aneurysm     Cancer kidney    COPD (chronic obstructive pulmonary disease)     Prostate disease

## 2021-09-15 NOTE — H&P PST ADULT - NSICDXPASTSURGICALHX_GEN_ALL_CORE_FT
PAST SURGICAL HISTORY:  H/O hernia repair     H/O left nephrectomy & 3 ribs    History of cholecystectomy     S/P TURP

## 2021-09-15 NOTE — H&P PST ADULT - HISTORY OF PRESENT ILLNESS
Pt states since 2000 he has had a total of 3 brain aneurysms. One was previously repaired and he has been doing follow ups on the others ever since. Recently pt had a head CT which showed an increase in size for one of his aneurysms. Pt now proceeding with listed procedure to determine best approach to address aneurysm. Denies any neurological symptoms.     Denies any chest pain, difficulty breathing, SOB, palpitations, dysuria, URI, or any other infections in the last 2 weeks. Denies any recent travel, contact, or exposure to any persons with known or suspected COVID-19. Pt also denies COVID testing within the last 2 weeks. Pt admits to receiving all doses of Moderna COVID vaccine. Denies any suicidal or homicidal ideations. Pt advised to self quarantine until day of procedure. Exercise tolerance of 1 flight of stairs without dyspnea. GREG reviewed with patient. Pt verbalized understanding of all pre-operative instructions.    Anesthesia Alert  NO--Difficult Airway  NO--History of neck surgery or radiation  NO--Limited ROM of neck  NO--History of Malignant hyperthermia  NO--No personal or family history of Pseudocholinesterase deficiency.  NO--Prior Anesthesia Complication  NO--Latex Allergy  NO--Loose teeth  NO--History of Rheumatoid Arthritis  NO--GREG  NO--Bleeding Risk  NO--Other_____

## 2021-09-24 PROBLEM — J44.9 CHRONIC OBSTRUCTIVE PULMONARY DISEASE, UNSPECIFIED: Chronic | Status: ACTIVE | Noted: 2021-09-15

## 2021-09-28 ENCOUNTER — OUTPATIENT (OUTPATIENT)
Dept: OUTPATIENT SERVICES | Facility: HOSPITAL | Age: 71
LOS: 1 days | Discharge: HOME | End: 2021-09-28

## 2021-09-28 DIAGNOSIS — Z98.890 OTHER SPECIFIED POSTPROCEDURAL STATES: Chronic | ICD-10-CM

## 2021-09-28 DIAGNOSIS — Z11.59 ENCOUNTER FOR SCREENING FOR OTHER VIRAL DISEASES: ICD-10-CM

## 2021-09-28 DIAGNOSIS — Z90.5 ACQUIRED ABSENCE OF KIDNEY: Chronic | ICD-10-CM

## 2021-09-28 DIAGNOSIS — Z90.79 ACQUIRED ABSENCE OF OTHER GENITAL ORGAN(S): Chronic | ICD-10-CM

## 2021-09-28 DIAGNOSIS — Z90.49 ACQUIRED ABSENCE OF OTHER SPECIFIED PARTS OF DIGESTIVE TRACT: Chronic | ICD-10-CM

## 2021-10-01 ENCOUNTER — OUTPATIENT (OUTPATIENT)
Dept: OUTPATIENT SERVICES | Facility: HOSPITAL | Age: 71
LOS: 1 days | Discharge: HOME | End: 2021-10-01
Payer: MEDICARE

## 2021-10-01 ENCOUNTER — RESULT REVIEW (OUTPATIENT)
Age: 71
End: 2021-10-01

## 2021-10-01 DIAGNOSIS — Z98.890 OTHER SPECIFIED POSTPROCEDURAL STATES: Chronic | ICD-10-CM

## 2021-10-01 DIAGNOSIS — Z90.49 ACQUIRED ABSENCE OF OTHER SPECIFIED PARTS OF DIGESTIVE TRACT: Chronic | ICD-10-CM

## 2021-10-01 DIAGNOSIS — Z90.5 ACQUIRED ABSENCE OF KIDNEY: Chronic | ICD-10-CM

## 2021-10-01 DIAGNOSIS — Z90.79 ACQUIRED ABSENCE OF OTHER GENITAL ORGAN(S): Chronic | ICD-10-CM

## 2021-10-01 PROCEDURE — 36224 PLACE CATH CAROTD ART: CPT | Mod: 50

## 2021-10-01 PROCEDURE — 76937 US GUIDE VASCULAR ACCESS: CPT | Mod: 26

## 2021-10-08 ENCOUNTER — APPOINTMENT (OUTPATIENT)
Dept: NEUROSURGERY | Facility: CLINIC | Age: 71
End: 2021-10-08
Payer: MEDICARE

## 2021-10-08 PROCEDURE — 99213 OFFICE O/P EST LOW 20 MIN: CPT

## 2021-10-13 DIAGNOSIS — I67.1 CEREBRAL ANEURYSM, NONRUPTURED: ICD-10-CM

## 2021-10-14 ENCOUNTER — NON-APPOINTMENT (OUTPATIENT)
Age: 71
End: 2021-10-14

## 2021-10-14 ENCOUNTER — APPOINTMENT (OUTPATIENT)
Dept: NEUROSURGERY | Facility: CLINIC | Age: 71
End: 2021-10-14
Payer: MEDICARE

## 2021-10-14 PROCEDURE — 99443: CPT

## 2021-10-14 NOTE — REASON FOR VISIT
[Follow-Up: _____] : a [unfilled] follow-up visit [FreeTextEntry1] : Familia was reviewed today in the neurosurgery clinic via Telehealth. We went over the results of his recent catheter angiogram of his brain as well as the outcome and opinions from our recent discussion at the vascular conference. Essentially after careful aanlysis of his previous imaging, the aneurysm does not appear to have materially increased in size over the past 17 years. Additionally it is fusiform in nature and is challening to treat from both an endovascular and surgical perspective with not insignificant risks of therapy. The natural history of this aneurysm may very well be better than the risks of aneurysm repair in this situation. He was quite concerned about the risks of the aneurysm and we discussed this in detail and the recommendations of the conference to continue observation. We agreed to perform another CTA in 3 months time as per his request.

## 2021-10-15 NOTE — PHYSICAL EXAM
[General Appearance - In No Acute Distress] : in no acute distress [General Appearance - Alert] : alert

## 2021-10-18 NOTE — RESULTS/DATA
[FreeTextEntry1] :  EXAM:  IR PROCEDURE NEURO      \par \par \par PROCEDURE DATE:  10/01/2021  \par \par \par \par \par INTERPRETATION:  Diagnostic cerebral catheter angiogram\par \par DATE OF SERVICE: 10/01/2021\par \par : Ruben SUAREZ\par \par Assist: Le BURGOS\par \par Anesthesia: Local anesthetic to groin\par \par INDICATION:\par \par This 71-year-old gentleman with known multiple intracranial aneurysms, and a previous right-sided M1 aneurysm that was clipped approximately 20 years ago which was unruptured, presents for catheter angiographic evaluation of intracranial aneurysms on the concern that there was growth of his  larger right-sided bifurcation MCA aneurysm. The process of informed consent was completed with the patient and he understood the risks and benefits of catheter angiographic procedure.\par \par Access: Right common femoral artery\par \par PROCEDURE AND FINDINGS:\par \par A preliminary x-ray was obtained which determined the position of the femoral head and inguinal ligament. The patient was prepped and draped in the standard fashion. A preliminary ultrasound was performed which determined there was a patent common as well as superficial and deep femoral artery. Next, using direct ultrasound guidance, a micropuncture needle was advanced into the right common femoral artery. Using the Seldinger technique, a 5 Canadian 10 cm groin sheath was introduced into the right common femoral artery. Angiographic runs from within the right common femoral artery determined that the puncture site was above the bifurcation below the inguinal ligament, and that there was antegrade arterial flow within the common as well as internal and external femoral arteries. The sheath was attached to a heparinized saline flush.\par \par A 5 Canadian Berenstein diagnostic catheter over a Terumo Glidewire was advanced into the aortic arch. The following vessels were then selected using a combination of fluoroscopic and roadmap guidance and then imaged in a digital subtraction fashion using biplane AP and lateral imaging.:\par \par Right common carotid artery\par \par There is normal antegrade arterial flow within the right-sided cervical common as well as internal and external carotid arteries with no significant flow-limiting stenosis. A tortuous loop is noted within the cervical internal carotid artery.\par \par Right internal carotid artery\par \par There is normal antegrade arterial flow within the right-sided internal carotid artery. There is cross-filling towards the left-hand side via a patent anterior communicating artery. There is also a fetal-type configuration of the posterior cerebral artery that is noted on the right-hand side.\par \par An aneurysm clip is noted at the position of the distal M1 artery with no evidence of residual aneurysm arising from the aneurysm clip. There is a fusiform type aneurysmal dilatation of the bifurcation of the right-sided middle cerebral artery, which measures approximately 10 mm in maximum diameter. This incorporates both M2 branches and give rise to a shallow based aneurysm with no obvious secondary daughter sacs  or secondary bumps. The secondary aneurysm which arises along the medial and posterior aspect of the aneurysm measures approximately 4 mm in height.\par \par A 3-D angiographic spin was performed in order to obtain images that could not be obtained from the standard biplane imaging alone that could help with treatment planning aneurysm analysis. These images were reconstructed on a separate computerized workstation. The interpretation of the findings the right internal carotid artery includes the findings from the 3-D angiographic spin.\par \par Left internal carotid artery\par \par This demonstrated normal antegrade arterial flow within the left-sided internal carotid artery. A 3-D angiographic spin was performed from within the left-sided internal carotid artery. These images were reconstructed on a separate computerized workstation and the findings as described include these images as well.\par \par There is no obvious intracranial aneurysm or arteriovenous malformation or fistula is noted in these injections. There is otherwise normal antegrade arterial flow within the left-sided internal carotid artery.\par \par Following the procedure, all devices were removed from the patient and hemostasis was achieved with 6 Canadian Angio-Seal. There were no immediate complications.\par \par IMPRESSION:\par \par 4 mm x 10 mm right-sided middle cerebral artery bifurcation fusiform type aneurysm\par \par --- End of Report ---\par \par \par \par \par \par \par JAMEL HARPER \par This document has been electronically signed. Oct  6 2021  3:32PM\par \par [id number]^RI^DC

## 2021-10-18 NOTE — HISTORY OF PRESENT ILLNESS
[FreeTextEntry1] : This 71-year-old gentleman with known multiple intracranial aneurysms, and a previous right-sided M1 aneurysm that was clipped approximately 20 years ago which was unruptured,  concern that there was growth of his  larger right-sided bifurcation MCA aneurysm. \par \par Patient is here for follow-up to discuss cerebral angiogram and options to intervention.  Currently patient is without any complaints. No complaints of headaches.

## 2021-10-18 NOTE — ASSESSMENT
[FreeTextEntry1] : This 71-year-old gentleman with known multiple intracranial aneurysms, and a previous right-sided M1 aneurysm that was clipped approximately 20 years ago which was unruptured,presents with concern that there was growth of his  larger right-sided bifurcation MCA aneurysm;  recent diagnostic cerebral angiogram done October 1, 2021noting 4 mm x 10 mm right-sided middle cerebral artery bifurcation fusiform type aneurysm.\par -Patient and fiance very upset about the findings of the diagnostic cerebral angiogram. They insisted that Dr. Miranda have it dealt with.\par -I explained what a cerebral aneurysm was and walked through the imaging studies of the cerebral angiogram with the patient.  I discussed the many options of cerebral aneurysms  interventions, but insisted that one option may be observation as risks and benefits will need to be weighed.\par -I explained to the patient that we will share his images with the cerebrovascular conference of multiple subspecialties including but not limited to other Neurosurgeons, Neurologist, Interventionalists throughout the whole [facility] and hear their opinion on it. Patient was at ease and asked that Dr. Miranda call him as soon as he comes with a plan.\par -Patient and fiance understood all, asked pertinent questions, all of which were answered to their satisfaction. \par -Case and discussion with patient and fiance discussed with Dr. Miranda who agrees with plan and management.  He plans to speak with the patient in the near future.

## 2021-11-18 ENCOUNTER — OUTPATIENT (OUTPATIENT)
Dept: OUTPATIENT SERVICES | Facility: HOSPITAL | Age: 71
LOS: 1 days | Discharge: HOME | End: 2021-11-18
Payer: MEDICARE

## 2021-11-18 DIAGNOSIS — Z90.5 ACQUIRED ABSENCE OF KIDNEY: Chronic | ICD-10-CM

## 2021-11-18 DIAGNOSIS — Z98.890 OTHER SPECIFIED POSTPROCEDURAL STATES: Chronic | ICD-10-CM

## 2021-11-18 DIAGNOSIS — Z90.79 ACQUIRED ABSENCE OF OTHER GENITAL ORGAN(S): Chronic | ICD-10-CM

## 2021-11-18 DIAGNOSIS — C64.9 MALIGNANT NEOPLASM OF UNSPECIFIED KIDNEY, EXCEPT RENAL PELVIS: ICD-10-CM

## 2021-11-18 DIAGNOSIS — Z90.49 ACQUIRED ABSENCE OF OTHER SPECIFIED PARTS OF DIGESTIVE TRACT: Chronic | ICD-10-CM

## 2021-11-18 PROCEDURE — 76775 US EXAM ABDO BACK WALL LIM: CPT | Mod: 26

## 2021-12-03 ENCOUNTER — INPATIENT (INPATIENT)
Facility: HOSPITAL | Age: 71
LOS: 0 days | Discharge: LEFT BEFORE DISCHARGE | End: 2021-12-03
Attending: INTERNAL MEDICINE | Admitting: INTERNAL MEDICINE
Payer: MEDICARE

## 2021-12-03 VITALS
OXYGEN SATURATION: 93 % | HEIGHT: 65 IN | SYSTOLIC BLOOD PRESSURE: 124 MMHG | HEART RATE: 108 BPM | DIASTOLIC BLOOD PRESSURE: 59 MMHG | TEMPERATURE: 98 F | WEIGHT: 130.07 LBS | RESPIRATION RATE: 22 BRPM

## 2021-12-03 DIAGNOSIS — Z90.79 ACQUIRED ABSENCE OF OTHER GENITAL ORGAN(S): Chronic | ICD-10-CM

## 2021-12-03 DIAGNOSIS — Z90.49 ACQUIRED ABSENCE OF OTHER SPECIFIED PARTS OF DIGESTIVE TRACT: Chronic | ICD-10-CM

## 2021-12-03 DIAGNOSIS — Z98.890 OTHER SPECIFIED POSTPROCEDURAL STATES: Chronic | ICD-10-CM

## 2021-12-03 DIAGNOSIS — Z90.5 ACQUIRED ABSENCE OF KIDNEY: Chronic | ICD-10-CM

## 2021-12-03 LAB
ALBUMIN SERPL ELPH-MCNC: 4.2 G/DL — SIGNIFICANT CHANGE UP (ref 3.5–5.2)
ALP SERPL-CCNC: 99 U/L — SIGNIFICANT CHANGE UP (ref 30–115)
ALT FLD-CCNC: 16 U/L — SIGNIFICANT CHANGE UP (ref 0–41)
ANION GAP SERPL CALC-SCNC: 14 MMOL/L — SIGNIFICANT CHANGE UP (ref 7–14)
AST SERPL-CCNC: 28 U/L — SIGNIFICANT CHANGE UP (ref 0–41)
BASE EXCESS BLDV CALC-SCNC: 0.6 MMOL/L — SIGNIFICANT CHANGE UP (ref -2–3)
BASOPHILS # BLD AUTO: 0.02 K/UL — SIGNIFICANT CHANGE UP (ref 0–0.2)
BASOPHILS NFR BLD AUTO: 0.1 % — SIGNIFICANT CHANGE UP (ref 0–1)
BILIRUB SERPL-MCNC: 0.3 MG/DL — SIGNIFICANT CHANGE UP (ref 0.2–1.2)
BUN SERPL-MCNC: 26 MG/DL — HIGH (ref 10–20)
CA-I SERPL-SCNC: 1.31 MMOL/L — SIGNIFICANT CHANGE UP (ref 1.15–1.33)
CALCIUM SERPL-MCNC: 9.9 MG/DL — SIGNIFICANT CHANGE UP (ref 8.5–10.1)
CHLORIDE SERPL-SCNC: 102 MMOL/L — SIGNIFICANT CHANGE UP (ref 98–110)
CO2 SERPL-SCNC: 17 MMOL/L — SIGNIFICANT CHANGE UP (ref 17–32)
CREAT SERPL-MCNC: 1.4 MG/DL — SIGNIFICANT CHANGE UP (ref 0.7–1.5)
EOSINOPHIL # BLD AUTO: 0.04 K/UL — SIGNIFICANT CHANGE UP (ref 0–0.7)
EOSINOPHIL NFR BLD AUTO: 0.3 % — SIGNIFICANT CHANGE UP (ref 0–8)
GAS PNL BLDV: 134 MMOL/L — LOW (ref 136–145)
GAS PNL BLDV: SIGNIFICANT CHANGE UP
GLUCOSE SERPL-MCNC: 108 MG/DL — HIGH (ref 70–99)
HCO3 BLDV-SCNC: 26 MMOL/L — SIGNIFICANT CHANGE UP (ref 22–29)
HCT VFR BLD CALC: 42.7 % — SIGNIFICANT CHANGE UP (ref 42–52)
HCT VFR BLDA CALC: 41 % — SIGNIFICANT CHANGE UP (ref 39–51)
HGB BLD CALC-MCNC: 13.7 G/DL — SIGNIFICANT CHANGE UP (ref 12.6–17.4)
HGB BLD-MCNC: 13.8 G/DL — LOW (ref 14–18)
IMM GRANULOCYTES NFR BLD AUTO: 0.5 % — HIGH (ref 0.1–0.3)
LACTATE BLDV-MCNC: 1.2 MMOL/L — SIGNIFICANT CHANGE UP (ref 0.5–2)
LYMPHOCYTES # BLD AUTO: 1.1 K/UL — LOW (ref 1.2–3.4)
LYMPHOCYTES # BLD AUTO: 8.1 % — LOW (ref 20.5–51.1)
MCHC RBC-ENTMCNC: 28.8 PG — SIGNIFICANT CHANGE UP (ref 27–31)
MCHC RBC-ENTMCNC: 32.3 G/DL — SIGNIFICANT CHANGE UP (ref 32–37)
MCV RBC AUTO: 89 FL — SIGNIFICANT CHANGE UP (ref 80–94)
MONOCYTES # BLD AUTO: 1.11 K/UL — HIGH (ref 0.1–0.6)
MONOCYTES NFR BLD AUTO: 8.1 % — SIGNIFICANT CHANGE UP (ref 1.7–9.3)
NEUTROPHILS # BLD AUTO: 11.29 K/UL — HIGH (ref 1.4–6.5)
NEUTROPHILS NFR BLD AUTO: 82.9 % — HIGH (ref 42.2–75.2)
NRBC # BLD: 0 /100 WBCS — SIGNIFICANT CHANGE UP (ref 0–0)
NT-PROBNP SERPL-SCNC: 104 PG/ML — SIGNIFICANT CHANGE UP (ref 0–300)
PCO2 BLDV: 47 MMHG — SIGNIFICANT CHANGE UP (ref 42–55)
PH BLDV: 7.36 — SIGNIFICANT CHANGE UP (ref 7.32–7.43)
PLATELET # BLD AUTO: 309 K/UL — SIGNIFICANT CHANGE UP (ref 130–400)
PO2 BLDV: 31 MMHG — SIGNIFICANT CHANGE UP
POTASSIUM BLDV-SCNC: 4.6 MMOL/L — SIGNIFICANT CHANGE UP (ref 3.5–5.1)
POTASSIUM SERPL-MCNC: 5.4 MMOL/L — HIGH (ref 3.5–5)
POTASSIUM SERPL-SCNC: 5.4 MMOL/L — HIGH (ref 3.5–5)
PROT SERPL-MCNC: 8 G/DL — SIGNIFICANT CHANGE UP (ref 6–8)
RBC # BLD: 4.8 M/UL — SIGNIFICANT CHANGE UP (ref 4.7–6.1)
RBC # FLD: 13.5 % — SIGNIFICANT CHANGE UP (ref 11.5–14.5)
SAO2 % BLDV: 57 % — SIGNIFICANT CHANGE UP
SARS-COV-2 RNA SPEC QL NAA+PROBE: SIGNIFICANT CHANGE UP
SODIUM SERPL-SCNC: 133 MMOL/L — LOW (ref 135–146)
TROPONIN T SERPL-MCNC: <0.01 NG/ML — SIGNIFICANT CHANGE UP
WBC # BLD: 13.63 K/UL — HIGH (ref 4.8–10.8)
WBC # FLD AUTO: 13.63 K/UL — HIGH (ref 4.8–10.8)

## 2021-12-03 PROCEDURE — 71045 X-RAY EXAM CHEST 1 VIEW: CPT | Mod: 26

## 2021-12-03 PROCEDURE — 99285 EMERGENCY DEPT VISIT HI MDM: CPT

## 2021-12-03 PROCEDURE — 71275 CT ANGIOGRAPHY CHEST: CPT | Mod: 26,MA

## 2021-12-03 PROCEDURE — 93010 ELECTROCARDIOGRAM REPORT: CPT

## 2021-12-03 NOTE — ED PROVIDER NOTE - PHYSICAL EXAMINATION
CONSTITUTIONAL: Well-developed; well-nourished; in no acute distress, nontoxic appearing  SKIN: skin exam is warm and dry  HEAD: Normocephalic; atraumatic  ENT: MMM   NECK: ROM intact.  CARD: S1, S2 normal, no murmur  RESP:  Good air movement bilaterally  ABD: soft; non-distended; non-tender.    EXT: Normal ROM   NEURO: awake, alert, following commands, oriented, grossly unremarkable. No Focal deficits. GCS 15.   PSYCH: Cooperative, appropriate.

## 2021-12-03 NOTE — ED PROVIDER NOTE - NS ED ROS FT
Review of Systems:  	•	CONSTITUTIONAL: no fever   	•	SKIN: no rash   	•	ENT: no sore throat   	•	RESPIRATORY: +hemoptysis, no shortness of breath, no cough   	•	CARDIAC: no chest pain, no palpitations  	•	GI: no abd pain, no nausea, no vomiting, no diarrhea   	•	GENITO-URINARY: no discharge, no dysuria; no hematuria, no increased urinary frequency  	•	MUSCULOSKELETAL: no joint paint, no swelling, no redness  	•	NEUROLOGIC: no weakness, no headache, no syncope   	•	PSYCH: no anxiety, non suicidal, non homicidal, no hallucination, no depression

## 2021-12-03 NOTE — ED ADULT TRIAGE NOTE - CHIEF COMPLAINT QUOTE
pt states he had an episode of coughing up blood today, PTA. pt coughed up blood in triage. pt states he smokes daily, denies chest pain. denies back pain. denies vomiting. denies night sweats, denies fever. pt states he coughs "a lot during the day" but this is the first time he coughed blood pt states he had an episode of "coughing up blood" today, PTA. pt has a bag of bright red blood that he reports coughing up before coming to ER. pt coughed up more bright red blood in triage. pt states he smokes daily, denies chest pain. denies back pain. denies vomiting. denies night sweats, denies fever at home. pt states he coughs "a lot during the day" but this is the first time he coughed blood

## 2021-12-03 NOTE — ED PROVIDER NOTE - PATIENT PORTAL LINK FT
You can access the FollowMyHealth Patient Portal offered by [facility] by registering at the following website: [email or web address]. By joining [facility]’s FollowMyHealth portal, you will also be able to view your health information using other applications (apps) compatible with our system.

## 2021-12-03 NOTE — ED PROVIDER NOTE - PROGRESS NOTE DETAILS
The patient has decided to leave against medical advice.  The patient is AAOx3, not intoxicated, and displays normal decision making ability. We discussed all risks, benefits, and alternatives to the progression of treatment and the potential dangers of leaving including but not limited to permanent disability, injury, and death. The patient was instructed that they are welcome to change their decision to leave against medical advice and return to the emergency department at any time and for any reason in order to allow us to render care.

## 2021-12-03 NOTE — ED PROVIDER NOTE - ATTENDING CONTRIBUTION TO CARE
71M with pmh COPD not on O2, active smoker, prostate cancer in remission, who presents to [facility] for hematemesis that started this morning. He reports coughing up cups of blood with clots, and this has never happened before. Denies chest pain, back or abdominal pain, lightheadedness, dizziness, unintentional weight loss. He is not on blood thinners.     vitals noted on arrival, mildly tachycardic.     Gen - thin elderly male sitting in NAD, Head - NCAT, Pharynx - clear, MMM, Heart - RRR, no m/g/r, Lungs - CTAB, no w/c/r, Abdomen - soft, NT, ND, Skin - No rash, Extremities - FROM, no edema, erythema, ecchymosis, brisk cap refill, Neuro - A&O x3, equal strength and sensation, non-focal exam    a/p:  hematemesis   + RF for malignancy, PE  ekg, cxr, labs  ct angio chest  reassess 71M with pmh COPD not on O2, active smoker, prostate cancer in remission, who presents to [facility] for hematemesis that started this morning. He reports coughing up cups of blood with clots, and this has never happened before. Has a bag with him with large volume blood. Denies chest pain, back or abdominal pain, lightheadedness, dizziness, unintentional weight loss. He is not on blood thinners.     vitals noted on arrival, mildly tachycardic.     Gen - thin elderly male sitting in NAD, Head - NCAT, Pharynx - clear, MMM, Heart - RRR, no m/g/r, Lungs - CTAB, no w/c/r, Abdomen - soft, NT, ND, Skin - No rash, Extremities - FROM, no edema, erythema, ecchymosis, brisk cap refill, Neuro - A&O x3, equal strength and sensation, non-focal exam    a/p:  hematemesis   + RF for malignancy, PE  ekg, cxr, labs  ct angio chest  reassess

## 2021-12-03 NOTE — ED PROVIDER NOTE - CARE PROVIDER_API CALL
Jaiden Iniguez  CRITICAL CARE MEDICINE  [address] [place] [address], [address]  [place] [address]  Phone: [phone]  Fax: [phone]  Follow Up Time: Urgent    [facility] Emergency Department,   Phone: (   )    -  Fax: (   )    -  Follow Up Time: Urgent

## 2021-12-03 NOTE — ED PROVIDER NOTE - NSFOLLOWUPINSTRUCTIONS_ED_ALL_ED_FT
Please return to [facility] for further evaluation and management of your bleeding.     Hemoptysis    WHAT YOU NEED TO KNOW:    Hemoptysis is coughing up blood. This occurs when blood vessels in your airway or lungs weaken or break, and begin to bleed. You may bleed in small or large amounts that appear in your sputum (spit).     DISCHARGE INSTRUCTIONS:    Return to the emergency department if:     You have new or worsening chest pain or shortness of breath.      Your bleeding gets worse or you cough up a large amount of blood.      You cannot stop vomiting.      You are so dizzy that you think you may fall or faint.      You have pain or swelling in your legs.      Your legs and arms feel cold or look pale.    Contact your healthcare provider if:     You have new or increasing shortness of breath.      You have a fever.      You lose weight without trying.      You feel more weak and tired than usual.      You have a cough that does not improve or gets worse.       You have questions or concerns about your condition or care.    Medicines: You may need any of the following:     Antibiotics may be given to fight or prevent an infection caused by bacteria. Always take your antibiotics exactly as ordered by your healthcare provider. Do not stop taking your medicine unless directed by your healthcare provider.       Antitussives help control or stop your cough.       Take your medicine as directed. Contact your healthcare provider if you think your medicine is not helping or if you have side effects. Tell him or her if you are allergic to any medicine. Keep a list of the medicines, vitamins, and herbs you take. Include the amounts, and when and why you take them. Bring the list or the pill bottles to follow-up visits. Carry your medicine list with you in case of an emergency.    Follow up with your healthcare provider in 2 days or as directed: You may need frequent visits to monitor your condition and prevent further blood loss. Write down your questions so you remember to ask them during your visits.    Use caution with medicines: Certain medicines, such as NSAIDs, increase your risk for bleeding. Herbal supplements also increase your risk. Examples of herbal supplements are garlic, gingko, and ginseng. Ask your healthcare provider before you take any over-the-counter medicines.     Do not smoke, and do not go to smoky areas: Smoke may worsen your hemoptysis. Nicotine and other chemicals in cigarettes and cigars can also cause lung damage. Ask your healthcare provider for information if you currently smoke and need help to quit. E-cigarettes or smokeless tobacco still contain nicotine. Talk to your healthcare provider before you use these products.        © Copyright [employer] 2019 All illustrations and images included in CareNotes are the copyrighted property of [employer] Inc[employer] or [employer].

## 2021-12-03 NOTE — ED ADULT NURSE NOTE - CHIEF COMPLAINT QUOTE
pt states he had an episode of "coughing up blood" today, PTA. pt has a bag of bright red blood that he reports coughing up before coming to ER. pt coughed up more bright red blood in triage. pt states he smokes daily, denies chest pain. denies back pain. denies vomiting. denies night sweats, denies fever at home. pt states he coughs "a lot during the day" but this is the first time he coughed blood

## 2021-12-03 NOTE — ED PROVIDER NOTE - PROVIDER TOKENS
PROVIDER:[TOKEN:[[id number]],FOLLOWUP:[Urgent]],FREE:[LAST:[[facility] Emergency Department],PHONE:[(   )    -],FAX:[(   )    -],FOLLOWUP:[Urgent]]

## 2021-12-04 ENCOUNTER — INPATIENT (INPATIENT)
Facility: HOSPITAL | Age: 71
LOS: 0 days | Discharge: LEFT BEFORE DISCHARGE | End: 2021-12-05
Attending: FAMILY MEDICINE | Admitting: FAMILY MEDICINE
Payer: MEDICARE

## 2021-12-04 VITALS
HEART RATE: 96 BPM | HEIGHT: 65 IN | OXYGEN SATURATION: 95 % | TEMPERATURE: 97 F | SYSTOLIC BLOOD PRESSURE: 109 MMHG | WEIGHT: 130.07 LBS | DIASTOLIC BLOOD PRESSURE: 78 MMHG | RESPIRATION RATE: 20 BRPM

## 2021-12-04 DIAGNOSIS — Z90.5 ACQUIRED ABSENCE OF KIDNEY: Chronic | ICD-10-CM

## 2021-12-04 DIAGNOSIS — Z98.890 OTHER SPECIFIED POSTPROCEDURAL STATES: Chronic | ICD-10-CM

## 2021-12-04 DIAGNOSIS — Z90.49 ACQUIRED ABSENCE OF OTHER SPECIFIED PARTS OF DIGESTIVE TRACT: Chronic | ICD-10-CM

## 2021-12-04 DIAGNOSIS — Z90.79 ACQUIRED ABSENCE OF OTHER GENITAL ORGAN(S): Chronic | ICD-10-CM

## 2021-12-04 LAB
ALBUMIN SERPL ELPH-MCNC: 4.1 G/DL — SIGNIFICANT CHANGE UP (ref 3.5–5.2)
ALP SERPL-CCNC: 94 U/L — SIGNIFICANT CHANGE UP (ref 30–115)
ALT FLD-CCNC: 16 U/L — SIGNIFICANT CHANGE UP (ref 0–41)
ANION GAP SERPL CALC-SCNC: 12 MMOL/L — SIGNIFICANT CHANGE UP (ref 7–14)
AST SERPL-CCNC: 17 U/L — SIGNIFICANT CHANGE UP (ref 0–41)
BASOPHILS # BLD AUTO: 0.03 K/UL — SIGNIFICANT CHANGE UP (ref 0–0.2)
BASOPHILS NFR BLD AUTO: 0.3 % — SIGNIFICANT CHANGE UP (ref 0–1)
BILIRUB SERPL-MCNC: 0.3 MG/DL — SIGNIFICANT CHANGE UP (ref 0.2–1.2)
BLD GP AB SCN SERPL QL: SIGNIFICANT CHANGE UP
BUN SERPL-MCNC: 34 MG/DL — HIGH (ref 10–20)
CALCIUM SERPL-MCNC: 9.8 MG/DL — SIGNIFICANT CHANGE UP (ref 8.5–10.1)
CHLORIDE SERPL-SCNC: 104 MMOL/L — SIGNIFICANT CHANGE UP (ref 98–110)
CO2 SERPL-SCNC: 21 MMOL/L — SIGNIFICANT CHANGE UP (ref 17–32)
CREAT SERPL-MCNC: 1.5 MG/DL — SIGNIFICANT CHANGE UP (ref 0.7–1.5)
EOSINOPHIL # BLD AUTO: 0.25 K/UL — SIGNIFICANT CHANGE UP (ref 0–0.7)
EOSINOPHIL NFR BLD AUTO: 2.7 % — SIGNIFICANT CHANGE UP (ref 0–8)
GLUCOSE SERPL-MCNC: 96 MG/DL — SIGNIFICANT CHANGE UP (ref 70–99)
HCT VFR BLD CALC: 40 % — LOW (ref 42–52)
HGB BLD-MCNC: 13 G/DL — LOW (ref 14–18)
IMM GRANULOCYTES NFR BLD AUTO: 0.4 % — HIGH (ref 0.1–0.3)
LYMPHOCYTES # BLD AUTO: 1.76 K/UL — SIGNIFICANT CHANGE UP (ref 1.2–3.4)
LYMPHOCYTES # BLD AUTO: 19.3 % — LOW (ref 20.5–51.1)
MCHC RBC-ENTMCNC: 29.2 PG — SIGNIFICANT CHANGE UP (ref 27–31)
MCHC RBC-ENTMCNC: 32.5 G/DL — SIGNIFICANT CHANGE UP (ref 32–37)
MCV RBC AUTO: 89.9 FL — SIGNIFICANT CHANGE UP (ref 80–94)
MONOCYTES # BLD AUTO: 0.9 K/UL — HIGH (ref 0.1–0.6)
MONOCYTES NFR BLD AUTO: 9.9 % — HIGH (ref 1.7–9.3)
NEUTROPHILS # BLD AUTO: 6.15 K/UL — SIGNIFICANT CHANGE UP (ref 1.4–6.5)
NEUTROPHILS NFR BLD AUTO: 67.4 % — SIGNIFICANT CHANGE UP (ref 42.2–75.2)
NRBC # BLD: 0 /100 WBCS — SIGNIFICANT CHANGE UP (ref 0–0)
NT-PROBNP SERPL-SCNC: 82 PG/ML — SIGNIFICANT CHANGE UP (ref 0–300)
PLATELET # BLD AUTO: 301 K/UL — SIGNIFICANT CHANGE UP (ref 130–400)
POTASSIUM SERPL-MCNC: 4.3 MMOL/L — SIGNIFICANT CHANGE UP (ref 3.5–5)
POTASSIUM SERPL-SCNC: 4.3 MMOL/L — SIGNIFICANT CHANGE UP (ref 3.5–5)
PROT SERPL-MCNC: 7.3 G/DL — SIGNIFICANT CHANGE UP (ref 6–8)
RBC # BLD: 4.45 M/UL — LOW (ref 4.7–6.1)
RBC # FLD: 13.9 % — SIGNIFICANT CHANGE UP (ref 11.5–14.5)
SODIUM SERPL-SCNC: 137 MMOL/L — SIGNIFICANT CHANGE UP (ref 135–146)
TROPONIN T SERPL-MCNC: <0.01 NG/ML — SIGNIFICANT CHANGE UP
WBC # BLD: 9.13 K/UL — SIGNIFICANT CHANGE UP (ref 4.8–10.8)
WBC # FLD AUTO: 9.13 K/UL — SIGNIFICANT CHANGE UP (ref 4.8–10.8)

## 2021-12-04 PROCEDURE — 99285 EMERGENCY DEPT VISIT HI MDM: CPT

## 2021-12-04 PROCEDURE — 99406 BEHAV CHNG SMOKING 3-10 MIN: CPT

## 2021-12-04 PROCEDURE — 71045 X-RAY EXAM CHEST 1 VIEW: CPT | Mod: 26

## 2021-12-04 PROCEDURE — 93010 ELECTROCARDIOGRAM REPORT: CPT

## 2021-12-04 PROCEDURE — 99223 1ST HOSP IP/OBS HIGH 75: CPT | Mod: 25

## 2021-12-04 RX ORDER — CHOLECALCIFEROL (VITAMIN D3) 125 MCG
0 CAPSULE ORAL
Qty: 0 | Refills: 0 | DISCHARGE

## 2021-12-04 RX ORDER — GABAPENTIN 400 MG/1
0 CAPSULE ORAL
Qty: 0 | Refills: 0 | DISCHARGE

## 2021-12-04 RX ORDER — BETHANECHOL CHLORIDE 25 MG
1 TABLET ORAL
Qty: 0 | Refills: 0 | DISCHARGE

## 2021-12-04 RX ORDER — FINASTERIDE 5 MG/1
5 TABLET, FILM COATED ORAL DAILY
Refills: 0 | Status: DISCONTINUED | OUTPATIENT
Start: 2021-12-04 | End: 2021-12-05

## 2021-12-04 RX ORDER — ATORVASTATIN CALCIUM 80 MG/1
1 TABLET, FILM COATED ORAL
Qty: 0 | Refills: 0 | DISCHARGE

## 2021-12-04 RX ORDER — GABAPENTIN 400 MG/1
300 CAPSULE ORAL THREE TIMES A DAY
Refills: 0 | Status: DISCONTINUED | OUTPATIENT
Start: 2021-12-04 | End: 2021-12-05

## 2021-12-04 RX ORDER — CHOLECALCIFEROL (VITAMIN D3) 125 MCG
1000 CAPSULE ORAL DAILY
Refills: 0 | Status: DISCONTINUED | OUTPATIENT
Start: 2021-12-04 | End: 2021-12-05

## 2021-12-04 RX ORDER — FINASTERIDE 5 MG/1
1 TABLET, FILM COATED ORAL
Qty: 0 | Refills: 0 | DISCHARGE

## 2021-12-04 RX ORDER — GABAPENTIN 400 MG/1
1 CAPSULE ORAL
Qty: 0 | Refills: 0 | DISCHARGE

## 2021-12-04 RX ORDER — TADALAFIL 10 MG/1
1 TABLET, FILM COATED ORAL
Qty: 0 | Refills: 0 | DISCHARGE

## 2021-12-04 RX ORDER — IPRATROPIUM/ALBUTEROL SULFATE 18-103MCG
3 AEROSOL WITH ADAPTER (GRAM) INHALATION EVERY 6 HOURS
Refills: 0 | Status: DISCONTINUED | OUTPATIENT
Start: 2021-12-04 | End: 2021-12-05

## 2021-12-04 RX ORDER — BETHANECHOL CHLORIDE 25 MG
25 TABLET ORAL THREE TIMES A DAY
Refills: 0 | Status: DISCONTINUED | OUTPATIENT
Start: 2021-12-04 | End: 2021-12-05

## 2021-12-04 RX ORDER — NICOTINE POLACRILEX 2 MG
1 GUM BUCCAL DAILY
Refills: 0 | Status: DISCONTINUED | OUTPATIENT
Start: 2021-12-04 | End: 2021-12-05

## 2021-12-04 RX ORDER — ATORVASTATIN CALCIUM 80 MG/1
20 TABLET, FILM COATED ORAL AT BEDTIME
Refills: 0 | Status: DISCONTINUED | OUTPATIENT
Start: 2021-12-04 | End: 2021-12-05

## 2021-12-04 RX ADMIN — Medication 25 MILLIGRAM(S): at 22:36

## 2021-12-04 RX ADMIN — GABAPENTIN 300 MILLIGRAM(S): 400 CAPSULE ORAL at 22:36

## 2021-12-04 RX ADMIN — ATORVASTATIN CALCIUM 20 MILLIGRAM(S): 80 TABLET, FILM COATED ORAL at 22:36

## 2021-12-04 NOTE — ED PROVIDER NOTE - OTHER FINDINGS
[facility]   Consult Note  [facility] 1 2 3 4 5    Date: 2021   Patient: Victorina Guevara   : 1937   DOA: 2021   MRN: [id number]   ROOM#: [id number]     Reason for Consult: UTI, abx recommendations  Requesting Physician:  Dr. Suzette Schilder  Collaborating Urologist on Call at time of admission: Dr. Avendaño Bonds: Frequent falls    History Obtained From:  patient, electronic medical record    HISTORY OF PRESENT ILLNESS:                The patient is a 81 y.o. male with significant past medical history of prostate cancer s/p RPP in , DM, and urinary retention managed with SPT who presented with frequent falls and a UTI. He was diagnosed with an ESBL Klebsiella and Proteus UTI and started on IV abx. He is now undergoing rehab in the ARU. Pt states he is feeling fatigued this morning, but well overall. Urine is clearing in suprapubic catheter. I offered to exchange his catheter today, and he declines. Agreeable to having it exchanged next week in our office after finishing current course of IV abx. Past Medical History:        Diagnosis Date    Acute urinary tract infection 3/15/2012    Ataxia 2010    Diabetes mellitus ([address]     type 2, controlled    Fusion of spine of cervical region 10/2012    Gait disturbance     History of prostate cancer     adenocarcinoma    History of tobacco use     Hyperlipidemia     Osteoarthritis      Past Surgical History:        Procedure Laterality Date    BLADDER SURGERY      COLON SURGERY      OTHER SURGICAL HISTORY  3/28/13    Cysto with removal of artificial sphinter and placement of suprapubic catheter.     PROSTATE SURGERY      PROSTATECTOMY      infection     Current Medications:   Current Facility-Administered Medications: 0.9 % sodium chloride infusion, 25 mL, IntraVENous, PRN  ondansetron (ZOFRAN-ODT) disintegrating tablet 4 mg, 4 mg, Oral, Q8H PRN **OR** ondansetron (ZOFRAN) injection 4 mg, 4 mg, IntraVENous, Q6H PRN  sodium chloride flush 0.9 % injection 5-40 mL, 5-40 mL, IntraVENous, 2 times per day  sodium chloride flush 0.9 % injection 5-40 mL, 5-40 mL, IntraVENous, PRN  dextrose 5 % solution, 100 mL/hr, IntraVENous, PRN  dextrose 50 % IV solution, 12.5 g, IntraVENous, PRN  glucagon (rDNA) injection 1 mg, 1 mg, IntraMUSCular, PRN  glucose (GLUTOSE) 40 % oral gel 15 g, 15 g, Oral, PRN  insulin lispro (HUMALOG) injection vial 0-3 Units, 0-3 Units, SubCUTAneous, Nightly  insulin lispro (HUMALOG) injection vial 0-6 Units, 0-6 Units, SubCUTAneous, TID WC  amLODIPine (NORVASC) tablet 10 mg, 10 mg, Oral, Daily  docusate sodium (COLACE) capsule 100 mg, 100 mg, Oral, BID PRN  hydrALAZINE (APRESOLINE) tablet 25 mg, 25 mg, Oral, 3 times per day  insulin glargine (LANTUS) injection vial 10 Units, 10 Units, SubCUTAneous, Nightly  labetalol (NORMODYNE) tablet 200 mg, 200 mg, Oral, 2 times per day **OR** [DISCONTINUED] labetalol (NORMODYNE) tablet 100 mg, 100 mg, Oral, 2 times per day  lisinopril (PRINIVIL;ZESTRIL) tablet 5 mg, 5 mg, Oral, Daily  meropenem (MERREM) 1,000 mg in sodium chloride 0.9 % 100 mL IVPB (mini-bag), 1,000 mg, IntraVENous, Q12H  oxybutynin (DITROPAN-XL) extended release tablet 5 mg, 5 mg, Oral, Nightly  alogliptin (NESINA) tablet 6.25 mg, 6.25 mg, Oral, Daily  enoxaparin (LOVENOX) injection 40 mg, 40 mg, SubCUTAneous, Daily  acetaminophen (TYLENOL) tablet 650 mg, 650 mg, Oral, Q4H PRN  polyethylene glycol (GLYCOLAX) packet 17 g, 17 g, Oral, Daily PRN  bisacodyl (DULCOLAX) suppository 10 mg, 10 mg, Rectal, Daily PRN    Allergies:  Patient has no known allergies. Social History:   TOBACCO:   reports that he quit smoking about 45 years ago. He smoked 0.50 packs per day. He has never used smokeless tobacco.  ETOH:   reports no history of alcohol use. DRUGS:   reports no history of drug use.     Family History:       Problem Relation Age of Onset    Cancer Mother     Diabetes Father     Heart Disease Father     High Blood Pressure Father     Diabetes Sister     High Blood Pressure Sister     Cancer Brother         prostate, breast    Diabetes Brother     Cancer Maternal Uncle     Diabetes Maternal Grandmother     Stroke Maternal Grandfather        REVIEW OF SYSTEMS:     CONSTITUTIONAL:  positive for  fatigue  RESPIRATORY:  negative  CARDIOVASCULAR:  negative  GASTROINTESTINAL:  negative  GENITOURINARY:  negative    PHYSICAL EXAM:      VITALS:  /65   Pulse 60   Temp 98.6 °F (37 °C) (Oral)   Resp 18   Ht 5' 8\" (1.727 m)   Wt 199 lb 1.2 oz (90.3 kg)   SpO2 98%   BMI 30.27 kg/m²      TEMPERATURE:  Current - Temp: 98.6 °F (37 °C); Max - Temp  Av.7 °F (37.1 °C)  Min: 98.6 °F (37 °C)  Max: 98.9 °F (37.2 °C)  24HR BLOOD PRESSURE RANGE:  Systolic (04QEV), AGT:859 , Min:123 , PXE:627   ; Diastolic (44ISX), SHS:31, Min:48, Max:70    8HR INTAKE OUTPUT:  In: 240 [P.O.:240]  Out: -   URINARY CATHETER OUTPUT (Hopper):     DRAIN/TUBE OUTPUT:        Physical Exam:  General appearance: alert, appears stated age, cooperative, no distress and mildly obese  Head: Normocephalic, without obvious abnormality, atraumatic  Back: No CVA tenderness  Abdomen: Soft, non-tender, non-distended. 16fr SPT in place, urine clear yellow.     DATA:    WBC:    Lab Results   Component Value Date    WBC 6.5 2021     Hemoglobin/Hematocrit:    Lab Results   Component Value Date    HGB 9.6 2021    HCT 31.4 2021     BMP:    Lab Results   Component Value Date     2021    K 4.6 2021    K 3.9 2018     2021    CO2 22 2021    BUN 27 2021    LABALBU 3.6 2021    CREATININE 1.7 2021    CALCIUM 9.3 2021    GFRAA 47 2021    LABGLOM 39 2021     PT/INR:    Lab Results   Component Value Date    PROTIME 11.3 2015    PROTIME 15.2 2011    INR 0.99 2015     Urine Culture: Susceptibility                Klebsiella pneumoniae (esbl) Proteus vulgaris       BACTERIAL SUSCEPTIBILITY PANEL USMAN BACTERIAL SUSCEPTIBILITY PANEL USMAN       amoxicillin-clavulanate (f) >16/8  Resistant >16/8  Resistant       ampicillin >16  Resistant >16  Resistant       ceFAZolin >16  Resistant >16  Resistant       cefepime >16  Resistant <=2  Sensitive       cefuroxime >16  Resistant >16  Resistant       ciprofloxacin >2  Resistant <=1  Sensitive       ertapenem <=0.5  Sensitive <=0.5  Sensitive       gentamicin >8  Resistant <=4  Sensitive       meropenem <=1  Sensitive <=1  Sensitive       nitrofurantoin 64  Intermediate >64  Resistant       piperacillin-tazobactam <=16  Sensitive <=16  Sensitive       tobramycin >8  Resistant           trimethoprim-sulfamethoxazole >2/38  Resistant <=2/38  Sensitive          Imaging:  CT HEAD WO CONTRAST    Result Date: 11/16/2021  EXAMINATION: CT OF THE HEAD WITHOUT CONTRAST; CT OF THE CERVICAL SPINE WITHOUT CONTRAST 11/16/2021 1:31 pm TECHNIQUE: CT of the head was performed without the administration of intravenous contrast. Dose modulation, iterative reconstruction, and/or weight based adjustment of the mA/kV was utilized to reduce the radiation dose to as low as reasonably achievable.; CT of the cervical spine was performed without the administration of intravenous contrast. Multiplanar reformatted images are provided for review. Dose modulation, iterative reconstruction, and/or weight based adjustment of the mA/kV was utilized to reduce the radiation dose to as low as reasonably achievable. COMPARISON: 06/18/2020. HISTORY: ORDERING SYSTEM PROVIDED HISTORY: hit head after fall TECHNOLOGIST PROVIDED HISTORY: Reason for exam:->hit head after fall Has a \"code stroke\" or \"stroke alert\" been called? ->No Decision Support Exception - unselect if not a suspected or confirmed emergency medical condition->Emergency Medical Condition (MA) Reason for Exam: hit head after fall Acuity: Acute Type of Exam: Initial FINDINGS: CT SCAN HEAD: BRAIN/VENTRICLES: There is no acute intracranial hemorrhage, mass effect or midline shift. No abnormal extra-axial fluid collection. The gray-white differentiation is maintained without evidence of an acute infarct. There is no evidence of hydrocephalus. The cerebral sulci and ventricles are enlarged compatible with diffuse cerebral atrophy. There is low-attenuation in the periventricular white matter and deep white matter of the brain representing changes of chronic small vessel ischemic disease. There are bilateral basal ganglia calcifications. ORBITS: The visualized portion of the orbits demonstrate no acute abnormality. SINUSES: The visualized paranasal sinuses and mastoid air cells demonstrate no acute abnormality. SOFT TISSUES/SKULL:  No acute abnormality involving the visualized skull or soft tissues. CT SCAN CERVICAL SPINE: BONES/ALIGNMENT: The alignment of the cervical spine is within normal limits. No acute fractures or dislocations are seen. There are postsurgical changes of posterior cervical spinal fusion between C3 and C7. DEGENERATIVE CHANGES: There is multilevel degenerative disc disease of the cervical spine. SOFT TISSUES: There is no prevertebral soft tissue swelling. 1. No acute intracranial abnormality. 2. No acute traumatic abnormality involving the cervical spine. CT CERVICAL SPINE WO CONTRAST    Result Date: 11/16/2021  EXAMINATION: CT OF THE HEAD WITHOUT CONTRAST; CT OF THE CERVICAL SPINE WITHOUT CONTRAST 11/16/2021 1:31 pm TECHNIQUE: CT of the head was performed without the administration of intravenous contrast. Dose modulation, iterative reconstruction, and/or weight based adjustment of the mA/kV was utilized to reduce the radiation dose to as low as reasonably achievable.; CT of the cervical spine was performed without the administration of intravenous contrast. Multiplanar reformatted images are provided for review.  Dose modulation, iterative reconstruction, and/or weight based adjustment of the mA/kV was utilized to reduce the radiation dose to as low as reasonably achievable. COMPARISON: 06/18/2020. HISTORY: ORDERING SYSTEM PROVIDED HISTORY: hit head after fall TECHNOLOGIST PROVIDED HISTORY: Reason for exam:->hit head after fall Has a \"code stroke\" or \"stroke alert\" been called? ->No Decision Support Exception - unselect if not a suspected or confirmed emergency medical condition->Emergency Medical Condition (MA) Reason for Exam: hit head after fall Acuity: Acute Type of Exam: Initial FINDINGS: CT SCAN HEAD: BRAIN/VENTRICLES: There is no acute intracranial hemorrhage, mass effect or midline shift. No abnormal extra-axial fluid collection. The gray-white differentiation is maintained without evidence of an acute infarct. There is no evidence of hydrocephalus. The cerebral sulci and ventricles are enlarged compatible with diffuse cerebral atrophy. There is low-attenuation in the periventricular white matter and deep white matter of the brain representing changes of chronic small vessel ischemic disease. There are bilateral basal ganglia calcifications. ORBITS: The visualized portion of the orbits demonstrate no acute abnormality. SINUSES: The visualized paranasal sinuses and mastoid air cells demonstrate no acute abnormality. SOFT TISSUES/SKULL:  No acute abnormality involving the visualized skull or soft tissues. CT SCAN CERVICAL SPINE: BONES/ALIGNMENT: The alignment of the cervical spine is within normal limits. No acute fractures or dislocations are seen. There are postsurgical changes of posterior cervical spinal fusion between C3 and C7. DEGENERATIVE CHANGES: There is multilevel degenerative disc disease of the cervical spine. SOFT TISSUES: There is no prevertebral soft tissue swelling. 1. No acute intracranial abnormality. 2. No acute traumatic abnormality involving the cervical spine.      XR CHEST PORTABLE    Result Date: 11/16/2021  EXAMINATION: ONE XRAY VIEW OF THE CHEST 11/16/2021 1:23 pm COMPARISON: June 18, 2020 HISTORY: ORDERING SYSTEM PROVIDED HISTORY: presyncopy TECHNOLOGIST PROVIDED HISTORY: Reason for exam:->presyncopy Reason for Exam: presyncopy FINDINGS: No evidence of consolidation, edema or other acute pulmonary process. No evidence of acute process of the cardiac or mediastinal structures. No evidence of pneumothorax or pleural effusion. Negative portable chest x-ray. No evidence of acute cardiopulmonary disease. Assessment & Plan:      Ankit Roy is a 81y.o. year old male admitted 11/18/2021 for UTI     1) Chronic Urinary Retention: managed with suprapubic catheter and exchanged monthly (last exchanged 11/4/21). He declines SPT exchange or irrigation today. Scheduled for SPT exchanged 12/4/21, will perform this next week. Recommend SPT exchanges q3 weeks. 2) Complicated UTI              Urine cx +Klebsiella pneumoniae ESBL >100,000 CFU/ml and Proteus Vulgaris >100,000 CFU/ml              WBC 6.5 11/18/21, afebrile              On IV Merrem since 11/18/21, recommend continuing treatment for 10 days total (end date 11/28/21). 3) H/o Prostate Cancer: S/p RPP with Dr. Radha Munoz in [address] [place] [address]. PSA 0.93 2/2021. On Eligard q6 months     Pt stable from a  standpoint. Will sign off, please call with any questions. Pt to follow up in our office in 1 week for suprapubic catheter exchange. Patient seen and examined, chart reviewed.      Electronically signed by Noel Emanuel PA-C on 11/22/2021 at 9:21 AM [email or web address]

## 2021-12-04 NOTE — H&P ADULT - NSICDXFAMILYHX_GEN_ALL_CORE_FT
FAMILY HISTORY:  FH: colon cancer, mother     FAMILY HISTORY:  FH: colon cancer, mother    Father  Still living? No  FHx: multiple myeloma, Age at diagnosis: Age Unknown    Sibling  Still living? No  FH: lupus, Age at diagnosis: Age Unknown

## 2021-12-04 NOTE — ED PROVIDER NOTE - OBJECTIVE STATEMENT
71 year old male, past medical history +smoker, copd not on home o2, htn, brain aneurysm, who represents with hemoptysis. patient with hemoptysis that began yesterday morning, presented [place] ED [place] for workup, CTA negative for PE, stable hemoglobin, patient left AMA. patient returns to ED with persistent symptoms. denies fever, chills, chest pain, SOB, abd pain, nausea/vomiting, hematemesis, dark stools, syncope.

## 2021-12-04 NOTE — ED PROVIDER NOTE - CLINICAL SUMMARY MEDICAL DECISION MAKING FREE TEXT BOX
ED workup from previous visit reviewed,  H/H repeated.  stable for now . Will need to trend.  will admit for further work up/Pulm eval.

## 2021-12-04 NOTE — H&P ADULT - NSHPPHYSICALEXAM_GEN_ALL_CORE
Vital Signs Last 24 Hrs  T(C): 36.1 (12-04-21 @ 16:17), Max: 36.1 (12-04-21 @ 16:17)  T(F): 97 (12-04-21 @ 16:17), Max: 97 (12-04-21 @ 16:17)  HR: 78 (12-04-21 @ 18:48) (78 - 96)  BP: 102/65 (12-04-21 @ 18:48) (102/65 - 109/78)  BP(mean): --  RR: 18 (12-04-21 @ 18:48) (18 - 20)  SpO2: 96% (12-04-21 @ 18:48) (95% - 96%)    GENERAL: NAD, lying in bed comfortably  HEAD:  Atraumatic, Normocephalic  EYES: EOMI, conjunctiva and sclera clear  ENT: Moist mucous membranes  NECK: Supple, No JVD  CHEST/LUNG: CTA b/l, Unlabored respirations  HEART: S1S2,  No murmurs, rubs, or gallops  ABDOMEN: Bowel sounds present; Soft, Nontender, Nondistended.   EXTREMITIES:  No clubbing, cyanosis, or edema  NERVOUS SYSTEM:  Alert & Oriented X3, speech clear. No deficits   MSK: FROM all 4 extremities, full and equal strength  SKIN: No rashes or lesions GENERAL: NAD, lying in bed comfortably  HEAD:  Atraumatic, Normocephalic  EYES: EOMI, conjunctiva and sclera clear  ENT: Moist mucous membranes  NECK: Supple, No JVD  CHEST/LUNG: CTA b/l, Unlabored respirations  HEART: S1S2,  No murmurs, rubs, or gallops  ABDOMEN: Bowel sounds present; Soft, Nontender, Nondistended.   EXTREMITIES:  No clubbing, cyanosis, or edema  NERVOUS SYSTEM:  Alert & Oriented X3, speech clear. No deficits   MSK: FROM all 4 extremities, full and equal strength  SKIN: No rashes or lesions

## 2021-12-04 NOTE — H&P ADULT - ASSESSMENT
71 year old male with hx of emphysema (not on home o2), htn, brain aneurysm admitted for hemoptysis for 2 days.     # Hemoptysis likely secondary to bullous rupture   # Emphysema  # Pulmonary HTN  - hemodynamically stable; sat 94% in RA; Hb 13  - not in exacerbation   - CT Angio Chest PE Protocol w/ IV Cont (12.03.21): No PE; Stable centrilobular emphysema and superimposed bilateral apical bullous formation.   - start Duoneb  - start Guaifenesin to suppress cough  - c/w tadalafil   - active type and screen   - trend H&H  - pulmonary consult     # Lung Nodule   - CT Angio Chest PE Protocol w/ IV Cont (12.03.21): Solid 7 mm right lower lobe nodule, previously 5 mm.  - outpt CT scan in 6 months     # HTN  - lifestyle modification     # BPH  - c/w Bethanechol and finasteride     # HLD  - c/w statin     # Current Smoker  - counseled to quit smoking  - start nicotine patch     # DVT ppx  - SCD for now     # DASH diet     # Ambulate as tolerated    # Full code

## 2021-12-04 NOTE — ED ADULT NURSE NOTE - NSIMPLEMENTINTERV_GEN_ALL_ED
Implemented All Fall with Harm Risk Interventions:  [place] to call system. Call bell, personal items and telephone within reach. Instruct patient to call for assistance. Room bathroom lighting operational. Non-slip footwear when patient is off stretcher. Physically safe environment: no spills, clutter or unnecessary equipment. Stretcher in lowest position, wheels locked, appropriate side rails in place. Provide visual cue, wrist band, yellow gown, etc. Monitor gait and stability. Monitor for mental status changes and reorient to person, place, and time. Review medications for side effects contributing to fall risk. Reinforce activity limits and safety measures with patient and family. Provide visual clues: red socks.

## 2021-12-04 NOTE — ED PROVIDER NOTE - PHYSICAL EXAMINATION
CONSTITUTIONAL: Well-developed; well-nourished; in no acute distress, nontoxic appearing  SKIN: skin exam is warm and dry  HEAD: Normocephalic; atraumatic  EYES: PERRL,  EOM intact; conjunctiva and sclera clear.  ENT: MMM   NECK: ROM intact.  CARD: S1, S2 normal, no murmur  RESP: No wheezes, rales or rhonchi. Good air movement bilaterally   EXT: Normal ROM. No cyanosis or edema.    NEURO: awake, alert, following commands, oriented, grossly unremarkable. No Focal deficits. GCS 15.   PSYCH: Cooperative, appropriate.

## 2021-12-04 NOTE — ED PROVIDER NOTE - NS ED ROS FT
Review of Systems:  	•	CONSTITUTIONAL: no fever   	•	SKIN: no rash    	•	ENT: no drooling, no sore throat, no difficulty swallowing   	•	RESPIRATORY: no shortness of breath, +cough, +hemoptysis  	•	CARDIAC: no chest pain, no palpitations  	•	GI: no abd pain, no nausea, no vomiting, no diarrhea   	•	MUSCULOSKELETAL: no joint paint, no swelling, no redness  	•	NEUROLOGIC: no headache, no syncope, no loc   	•	PSYCH: no anxiety, non suicidal, non homicidal, no hallucination, no depression

## 2021-12-04 NOTE — ED ADULT NURSE NOTE - CHIEF COMPLAINT QUOTE
BIBA from home as per EMS pt had AMA from the [place] site yesterday, pt has pulmonary nodules and was coughing up blood

## 2021-12-04 NOTE — H&P ADULT - HISTORY OF PRESENT ILLNESS
71 year old male, past medical history +smoker, copd not on home o2, htn, brain aneurysm, who represents with hemoptysis. patient with hemoptysis that began yesterday morning, presented to ED [place] for workup, CTA negative for PE, stable hemoglobin, patient left AMA. patient returns to ED with persistent symptoms. denies fever, chills, chest pain, SOB, abd pain, nausea/vomiting, hematemesis, dark stools, syncope. 71 year old male, past medical history +smoker, emphysema, not on home o2, htn, brain aneurysm, who represents with hemoptysis since yesterday morning and was "quite a lot". presented to ED [place] for workup yesterday but left AMA stating, "they were not doing anything", CTA yesterday was negative for PE, stable hemoglobin, Now patient returned to ED today with persistent symptoms. States stomach hurts only when vomiting otherwise fine.  Denies fever, chills, chest pain, SOB, abd pain, nausea/vomiting, hematemesis, dark stools, syncope. States appetite fine, denies any other bleeding.   EGD done 4 months ago and was fine by pts [place] physician Dr Denis.  71 year old male, past medical history +smoker, emphysema, not on home o2, htn, brain aneurysm, who represents with hemoptysis since yesterday morning and was "quite a lot". presented to ED [place] for workup yesterday but left AMA stating, "they were not doing anything", CTA yesterday was negative for PE, stable hemoglobin, Now patient returned to ED today with persistent symptoms. States stomach hurts only when vomiting otherwise fine.  Denies fever, chills, chest pain, SOB, abd pain, nausea/vomiting, hematemesis, dark stools, syncope. States appetite fine, denies any other bleeding.   EGD done 4 months ago and was fine by pts [facility] physician Dr Denis.

## 2021-12-04 NOTE — ED PROVIDER NOTE - ATTENDING CONTRIBUTION TO CARE
72 yo M h/o COPD, smoker, HTN presents for coughing up blood since yesterday.  Pt was seen at ED [place].  Has labs and CT. No PE, admission recommended but pt left AMA. Symptoms continued tonight so returned to ED. no pain, no SOB, no fever or chills.  On exam pt in NAD AAO x 3, Op clear, dry blood on lips, Abd soft nt nd, Lungs cta b/l no wrr, no edema, no calf tenderness

## 2021-12-04 NOTE — ED ADULT TRIAGE NOTE - CHIEF COMPLAINT QUOTE
BIBA from home as per EMS pt had AMA from the [place] site yesterday, pt has pulmonary nodules and was coughing up blood

## 2021-12-04 NOTE — H&P ADULT - NSHPLABSRESULTS_GEN_ALL_CORE
LABS:  cret                        13.0   9.13  )-----------( 301      ( 04 Dec 2021 16:40 )             40.0     12-04    137  |  104  |  34<H>  ----------------------------<  96  4.3   |  21  |  1.5    Ca    9.8      04 Dec 2021 16:40    TPro  7.3  /  Alb  4.1  /  TBili  0.3  /  DBili  x   /  AST  17  /  ALT  16  /  AlkPhos  94  12-04        RADIOLOGY:    CT Angio Chest PE Protocol w/ IV Cont (12.03.21 @ 17:24)     IMPRESSION:  No pulmonary embolism.  Stable centrilobular emphysema and superimposed bilateral apical bullous formation.  Solid 7 mm right lower lobe nodule, previously 5 mm. LABS:    cret                        13.0   9.13  )-----------( 301      ( 04 Dec 2021 16:40 )             40.0     12-04    137  |  104  |  34<H>  ----------------------------<  96  4.3   |  21  |  1.5    Ca    9.8      04 Dec 2021 16:40    TPro  7.3  /  Alb  4.1  /  TBili  0.3  /  DBili  x   /  AST  17  /  ALT  16  /  AlkPhos  94  12-04        RADIOLOGY:    CT Angio Chest PE Protocol w/ IV Cont (12.03.21 @ 17:24)     IMPRESSION:  No pulmonary embolism.  Stable centrilobular emphysema and superimposed bilateral apical bullous formation.  Solid 7 mm right lower lobe nodule, previously 5 mm. LABS:    cret                        13.0   9.13  )-----------( 301      ( 04 Dec 2021 16:40 )             40.0     12-04    137  |  104  |  34<H>  ----------------------------<  96  4.3   |  21  |  1.5    Ca    9.8      04 Dec 2021 16:40    TPro  7.3  /  Alb  4.1  /  TBili  0.3  /  DBili  x   /  AST  17  /  ALT  16  /  AlkPhos  94  12-04        CT Angio Chest PE Protocol w/ IV Cont (12.03.21): No PE; Stable centrilobular emphysema and superimposed bilateral apical bullous formation. Solid 7 mm right lower lobe nodule, previously 5 mm.

## 2021-12-05 VITALS
SYSTOLIC BLOOD PRESSURE: 129 MMHG | TEMPERATURE: 98 F | HEART RATE: 82 BPM | DIASTOLIC BLOOD PRESSURE: 74 MMHG | RESPIRATION RATE: 18 BRPM | OXYGEN SATURATION: 97 %

## 2021-12-08 DIAGNOSIS — Z90.5 ACQUIRED ABSENCE OF KIDNEY: ICD-10-CM

## 2021-12-08 DIAGNOSIS — J44.9 CHRONIC OBSTRUCTIVE PULMONARY DISEASE, UNSPECIFIED: ICD-10-CM

## 2021-12-08 DIAGNOSIS — Z85.528 PERSONAL HISTORY OF OTHER MALIGNANT NEOPLASM OF KIDNEY: ICD-10-CM

## 2021-12-08 DIAGNOSIS — R04.2 HEMOPTYSIS: ICD-10-CM

## 2021-12-08 DIAGNOSIS — Z85.46 PERSONAL HISTORY OF MALIGNANT NEOPLASM OF PROSTATE: ICD-10-CM

## 2021-12-08 DIAGNOSIS — Z91.19 PATIENT'S NONCOMPLIANCE WITH OTHER MEDICAL TREATMENT AND REGIMEN: ICD-10-CM

## 2021-12-08 DIAGNOSIS — Z88.5 ALLERGY STATUS TO NARCOTIC AGENT: ICD-10-CM

## 2021-12-08 DIAGNOSIS — Z88.8 ALLERGY STATUS TO OTHER DRUGS, MEDICAMENTS AND BIOLOGICAL SUBSTANCES STATUS: ICD-10-CM

## 2021-12-08 DIAGNOSIS — F17.200 NICOTINE DEPENDENCE, UNSPECIFIED, UNCOMPLICATED: ICD-10-CM

## 2021-12-09 DIAGNOSIS — Z85.528 PERSONAL HISTORY OF OTHER MALIGNANT NEOPLASM OF KIDNEY: ICD-10-CM

## 2021-12-09 DIAGNOSIS — R04.2 HEMOPTYSIS: ICD-10-CM

## 2021-12-09 DIAGNOSIS — J43.2 CENTRILOBULAR EMPHYSEMA: ICD-10-CM

## 2021-12-09 DIAGNOSIS — Z53.29 PROCEDURE AND TREATMENT NOT CARRIED OUT BECAUSE OF PATIENT'S DECISION FOR OTHER REASONS: ICD-10-CM

## 2021-12-09 DIAGNOSIS — F17.210 NICOTINE DEPENDENCE, CIGARETTES, UNCOMPLICATED: ICD-10-CM

## 2021-12-09 DIAGNOSIS — I10 ESSENTIAL (PRIMARY) HYPERTENSION: ICD-10-CM

## 2021-12-09 DIAGNOSIS — Z88.8 ALLERGY STATUS TO OTHER DRUGS, MEDICAMENTS AND BIOLOGICAL SUBSTANCES STATUS: ICD-10-CM

## 2021-12-09 DIAGNOSIS — I27.20 PULMONARY HYPERTENSION, UNSPECIFIED: ICD-10-CM

## 2021-12-09 DIAGNOSIS — R91.1 SOLITARY PULMONARY NODULE: ICD-10-CM

## 2021-12-09 DIAGNOSIS — N40.0 BENIGN PROSTATIC HYPERPLASIA WITHOUT LOWER URINARY TRACT SYMPTOMS: ICD-10-CM

## 2022-02-28 ENCOUNTER — RESULT REVIEW (OUTPATIENT)
Age: 72
End: 2022-02-28

## 2022-02-28 ENCOUNTER — APPOINTMENT (OUTPATIENT)
Dept: UROLOGY | Facility: CLINIC | Age: 72
End: 2022-02-28
Payer: MEDICARE

## 2022-02-28 ENCOUNTER — LABORATORY RESULT (OUTPATIENT)
Age: 72
End: 2022-02-28

## 2022-02-28 VITALS
DIASTOLIC BLOOD PRESSURE: 75 MMHG | SYSTOLIC BLOOD PRESSURE: 102 MMHG | HEART RATE: 73 BPM | HEIGHT: 65 IN | WEIGHT: 126 LBS | BODY MASS INDEX: 20.99 KG/M2

## 2022-02-28 PROCEDURE — 51798 US URINE CAPACITY MEASURE: CPT

## 2022-02-28 PROCEDURE — 99214 OFFICE O/P EST MOD 30 MIN: CPT

## 2022-02-28 RX ORDER — TADALAFIL 10 MG/1
10 TABLET, FILM COATED ORAL
Qty: 30 | Refills: 11 | Status: ACTIVE | COMMUNITY
Start: 2020-08-27 | End: 1900-01-01

## 2022-03-02 NOTE — ASSESSMENT
[FreeTextEntry1] : 71-year-old male with history of elevated postvoid residual and elevated PSA. Currently he is voiding well on what finasteride and bethanechol.albeit with an increasing PVR \par \par - cialis 10 mg po prn\par - Urinalysis and Culture\par - PSA drawn\par - 2 weeks to review and discuss further care

## 2022-03-02 NOTE — HISTORY OF PRESENT ILLNESS
[None] : None [FreeTextEntry1] : 71 year-old male who we have been following for elevated PSA and BPH. Currently, he is managed on finasteride, tamsulosin, and bethanechol with good efficacy and without adverse events.\par \par His most recent PSA, from Feb 2020 , is 1.10 (2.2 corrected) and improved from prior values.\par \par Renal/bladder sonogram, from June 2020,\par LEFT nephrectomy\par prevoid 372 and  cc\par prostate 26 grams\par \par Patient is voiding well  however he maintains a high post void residual >268 ml (unclear etiology of progressive PVR)

## 2022-03-04 LAB
APPEARANCE: CLEAR
BACTERIA UR CULT: ABNORMAL
BILIRUBIN URINE: NEGATIVE
BLOOD URINE: NEGATIVE
COLOR: NORMAL
GLUCOSE QUALITATIVE U: NEGATIVE
KETONES URINE: NEGATIVE
LEUKOCYTE ESTERASE URINE: ABNORMAL
NITRITE URINE: NEGATIVE
PH URINE: 6
PROTEIN URINE: NORMAL
PSA FREE FLD-MCNC: 26 %
PSA FREE SERPL-MCNC: 0.42 NG/ML
PSA SERPL-MCNC: 1.62 NG/ML
SPECIFIC GRAVITY URINE: 1.02
UROBILINOGEN URINE: NORMAL

## 2022-03-08 ENCOUNTER — OUTPATIENT (OUTPATIENT)
Dept: OUTPATIENT SERVICES | Facility: HOSPITAL | Age: 72
LOS: 1 days | Discharge: HOME | End: 2022-03-08
Payer: MEDICARE

## 2022-03-08 DIAGNOSIS — N40.1 BENIGN PROSTATIC HYPERPLASIA WITH LOWER URINARY TRACT SYMPTOMS: ICD-10-CM

## 2022-03-08 DIAGNOSIS — Z90.5 ACQUIRED ABSENCE OF KIDNEY: Chronic | ICD-10-CM

## 2022-03-08 DIAGNOSIS — Z90.49 ACQUIRED ABSENCE OF OTHER SPECIFIED PARTS OF DIGESTIVE TRACT: Chronic | ICD-10-CM

## 2022-03-08 DIAGNOSIS — R97.20 ELEVATED PROSTATE SPECIFIC ANTIGEN [PSA]: ICD-10-CM

## 2022-03-08 DIAGNOSIS — Z90.79 ACQUIRED ABSENCE OF OTHER GENITAL ORGAN(S): Chronic | ICD-10-CM

## 2022-03-08 DIAGNOSIS — Z98.890 OTHER SPECIFIED POSTPROCEDURAL STATES: Chronic | ICD-10-CM

## 2022-03-08 PROCEDURE — 76770 US EXAM ABDO BACK WALL COMP: CPT | Mod: 26

## 2022-04-04 ENCOUNTER — EMERGENCY (EMERGENCY)
Facility: HOSPITAL | Age: 72
LOS: 0 days | Discharge: HOME | End: 2022-04-04
Attending: EMERGENCY MEDICINE | Admitting: EMERGENCY MEDICINE
Payer: MEDICARE

## 2022-04-04 VITALS
HEART RATE: 78 BPM | DIASTOLIC BLOOD PRESSURE: 78 MMHG | HEIGHT: 65 IN | WEIGHT: 132.06 LBS | SYSTOLIC BLOOD PRESSURE: 108 MMHG | OXYGEN SATURATION: 97 % | TEMPERATURE: 96 F | RESPIRATION RATE: 18 BRPM

## 2022-04-04 DIAGNOSIS — M25.512 PAIN IN LEFT SHOULDER: ICD-10-CM

## 2022-04-04 DIAGNOSIS — Z90.5 ACQUIRED ABSENCE OF KIDNEY: Chronic | ICD-10-CM

## 2022-04-04 DIAGNOSIS — Z88.6 ALLERGY STATUS TO ANALGESIC AGENT: ICD-10-CM
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
## 2022-04-04 DIAGNOSIS — Z85.528 PERSONAL HISTORY OF OTHER MALIGNANT NEOPLASM OF KIDNEY: ICD-10-CM
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
## 2022-04-04 DIAGNOSIS — Z90.79 ACQUIRED ABSENCE OF OTHER GENITAL ORGAN(S): Chronic | ICD-10-CM

## 2022-04-04 DIAGNOSIS — Z98.890 OTHER SPECIFIED POSTPROCEDURAL STATES: Chronic | ICD-10-CM

## 2022-04-04 DIAGNOSIS — Z90.49 ACQUIRED ABSENCE OF OTHER SPECIFIED PARTS OF DIGESTIVE TRACT: Chronic | ICD-10-CM

## 2022-04-04 DIAGNOSIS — J44.9 CHRONIC OBSTRUCTIVE PULMONARY DISEASE, UNSPECIFIED: ICD-10-CM

## 2022-04-04 DIAGNOSIS — Z88.5 ALLERGY STATUS TO NARCOTIC AGENT: ICD-10-CM
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
## 2022-04-04 PROCEDURE — 73060 X-RAY EXAM OF HUMERUS: CPT | Mod: 26,RT

## 2022-04-04 PROCEDURE — 99284 EMERGENCY DEPT VISIT MOD MDM: CPT

## 2022-04-04 PROCEDURE — 73030 X-RAY EXAM OF SHOULDER: CPT | Mod: 26,RT

## 2022-04-04 NOTE — ED PROVIDER NOTE - OBJECTIVE STATEMENT
this is 70 yo male presents to ed for evaluation of right shoulder pain . patient states he hit shoulder into wall accidently while moving furniture.   patient states this happen yesterday

## 2022-04-04 NOTE — ED PROVIDER NOTE - PHYSICAL EXAMINATION
--EXAM--  VITAL SIGNS: I have reviewed vs documented at present.  CONSTITUTIONAL: Well-developed; well-nourished; in no acute distress.   SKIN: Warm and dry, no acute rash.   HEAD: Normocephalic; atraumatic.    CARD: S1, S2, Regular rate and rhythm.   RESP: No wheezes, rales or rhonchi.    EXT: right shoulder limited rom secondary to pain

## 2022-04-04 NOTE — ED PROVIDER NOTE - PATIENT PORTAL LINK FT
You can access the FollowMyHealth Patient Portal offered by [facility] by registering at the following website: [email or web address]. By joining [employer]’s FollowMyHealth portal, you will also be able to view your health information using other applications (apps) compatible with our system.

## 2022-04-04 NOTE — ED ADULT NURSE NOTE - TEMPLATE LIST FOR HEAD TO TOE ASSESSMENT
Iliana is a 22 year old female who presents with complaints of a possible urinary tract infection.  For the last 1 days, the pt has had dysuria.  Pt deniesfeverf   Vaginal Symptoms: No    Prior history of UTI's: Sometimes  Pt denies fevers, flank pain, or nausea and vomiting.    PMH:  Patient Active Problem List   Diagnosis   • Congenital cataract     Alg: ALLERGIES: no known allergies.  Meds:  Current Outpatient Medications   Medication Sig Dispense Refill   • levonorgestrel (MIRENA) (52 MG) 20 MCG/DAY intrauterine device 1 each by Intrauterine route.       No current facility-administered medications for this visit.          Visit Vitals  /64   Pulse 82   Temp 98.5 °F (36.9 °C)   Resp 18   Ht 5' 5\" (1.651 m)   SpO2 98%     Alert and oriented, no acute distress, nontoxic in appearance    Abdomen: bowel sounds present, some suprapubic tenderness  CVA Tenderness: no  Lab Results   Component Value Date    COLOR LAI (A) 03/03/2019         ASSESSMENT: (R30.0) Dysuria  (primary encounter diagnosis)  Comment: none  Plan: URINALYSIS & REFLEX MICRO WITH CULTURE IF         INDICATED, URINE CULTURE, URINE MICROSCOPIC         ONLY          /Patient  Uncomplicated Urinary Tract Infection      Iliana Steward had no medications administered during this visit.        Increased fluids and cranberry juice.  OTC pyridium prn.  Iliana Steward    was advised that this can turn the urine and/or contacts orange.  Side effects of all medications were discussed.  Patient is instructed to follow up in 2-3 days or as needed.  Patient is to go to the emergency room for any significant change or worsening.  Patient was discharged in a stable condition and was in agreement with the plan.  No further questions.    Nixon Werner,     
General

## 2022-04-04 NOTE — ED PROVIDER NOTE - ATTENDING CONTRIBUTION TO CARE
I personally evaluated the patient. I reviewed the Resident´s or Physician Assistant´s note (as assigned above), and agree with the findings and plan except as documented in my note.    71-year-old male in the emergency department for right shoulder injury sustained while helping a friend move a piece of furniture states he was pulled into a firm object, sustaining minor direct trauma to is   right shoulder.  No other complaints  The review of systems otherwise unremarkable    GENERAL: male in no distress.   HEENT: EOMI   NECK: FROM  CHEST: normal work of breathing noted. CTA bilateral.   CV: pulses intact S1S2 regular  EXTR: Right shoulder tender to palpation without bony deformities or crepitus.  FROM distally neurovascular intact  NEURO: AAO 3 no focal deficits  PSYCH: normal mood & mentation    Impression: Right shoulder injury    Plan: Imaging, reevaluate

## 2022-04-04 NOTE — ED PROVIDER NOTE - CARE PROVIDER_API CALL
Geo Dugan)  Orthopaedic Surgery  [address] [place] [address]  Phone: [phone]  Fax: [phone]  Follow Up Time:

## 2022-04-04 NOTE — ED ADULT NURSE NOTE - NSICDXFAMILYHX_GEN_ALL_CORE_FT
FAMILY HISTORY:  FH: colon cancer, mother    Father  Still living? No  FHx: multiple myeloma, Age at diagnosis: Age Unknown    Sibling  Still living? No  FH: lupus, Age at diagnosis: Age Unknown

## 2022-04-04 NOTE — ED PROVIDER NOTE - NS ED ATTENDING STATEMENT MOD
This was a shared visit with the RAFAEL. I reviewed and verified the documentation and independently performed the documented:

## 2022-04-04 NOTE — ED ADULT NURSE NOTE - NS_SISCREENINGSR_GEN_ALL_ED
- +swab on 4/6. Repeat swab on 4/13 for home infusion therapy to start.   -Stable on RA, O2sat 94-98%  -Labs stable and trending down  -No therapy indicated Negative

## 2022-04-04 NOTE — ED PROVIDER NOTE - NS ED ROS FT
Review of Systems:  	•	CONSTITUTIONAL - no fever, no diaphoresis, no chills  	•	SKIN - no rash    	•	RESPIRATORY - no shortness of breath, no cough  	•	CARDIAC - no chest pain, no palpitations    	•	MUSCULOSKELETAL - shoulder pain

## 2022-04-04 NOTE — ED PROVIDER NOTE - CLINICAL SUMMARY MEDICAL DECISION MAKING FREE TEXT BOX
71-year-old male emergency department for right shoulder injury.  Had imaging, no acute displaced fracture noted.  Will discharge with outpatient management and return follow-up instruction

## 2022-04-07 ENCOUNTER — APPOINTMENT (OUTPATIENT)
Dept: UROLOGY | Facility: CLINIC | Age: 72
End: 2022-04-07
Payer: MEDICARE

## 2022-04-07 VITALS — HEIGHT: 65 IN | WEIGHT: 134 LBS | BODY MASS INDEX: 22.33 KG/M2

## 2022-04-07 PROCEDURE — 99214 OFFICE O/P EST MOD 30 MIN: CPT

## 2022-04-07 NOTE — ADDENDUM
[FreeTextEntry1] : Documented by AUBREY Willams acting as a scribe for Dr. Justino Medrano \par \par All medical record entries made by the Scribe were at my, Dr. Medrano direction and\par personally dictated by me.  I have reviewed the chart and agree that the record\par accurately reflects my personal performance of the history, physical exam, procedure and imaging.  \par  \par \par

## 2022-04-07 NOTE — ASSESSMENT
[FreeTextEntry1] : 71-year-old male with history of elevated postvoid residual and elevated PSA. Currently he is voiding well on what finasteride and bethanechol.\par \par Reviewed PSA, US with patient, and Urine lab work. \par Given patient is asymptomatic, no indication for antibiotic treatment at this time. Patient made aware to contact office if he develops dysuria or hematuria or worsening LUTS. \par \par Plan\par -Follow up 6 months\par -PSA prior\par -US Kidney and Bladder prior

## 2022-04-07 NOTE — HISTORY OF PRESENT ILLNESS
[FreeTextEntry1] : 71 year old male who is followed for history of elevated PSA, BPH, and incomplete bladder emptying. He is currently managed on Finasteride, Tamsulosin, and Bethanechol with good efficacy without adverse events.\par \par He presents to office to review his most recent PSA and US of Kidney and Bladder.  \par PSA done 03/04/2022- 1.62 ng/mL with free 26%. \par Urine Culture- >100,000 Coag Negative Staph and WBC on microscopic- 15/HPF\par US Kidney and Bladder- Prostate size of 33 cc, PVR of 104 mL. Right renal cyst measures 3.5 cm. Status post left nephrectomy. \par \par Patient states he feels well. Denies dysuria, gross hematuria, incontinence. \par \par Good effect with Cialis for erections. \par \par Prior imaging:\par Renal/bladder sonogram, from June 2020,\par LEFT nephrectomy\par prevoid 372 and  cc\par prostate 26 grams\par

## 2022-05-08 ENCOUNTER — EMERGENCY (EMERGENCY)
Facility: HOSPITAL | Age: 72
LOS: 0 days | Discharge: HOME | End: 2022-05-08
Attending: EMERGENCY MEDICINE | Admitting: EMERGENCY MEDICINE
Payer: MEDICARE

## 2022-05-08 VITALS
RESPIRATION RATE: 18 BRPM | SYSTOLIC BLOOD PRESSURE: 123 MMHG | TEMPERATURE: 96 F | OXYGEN SATURATION: 98 % | HEART RATE: 76 BPM | WEIGHT: 136.03 LBS | DIASTOLIC BLOOD PRESSURE: 78 MMHG | HEIGHT: 65 IN

## 2022-05-08 DIAGNOSIS — M25.512 PAIN IN LEFT SHOULDER: ICD-10-CM

## 2022-05-08 DIAGNOSIS — Z88.6 ALLERGY STATUS TO ANALGESIC AGENT: ICD-10-CM

## 2022-05-08 DIAGNOSIS — Z90.5 ACQUIRED ABSENCE OF KIDNEY: Chronic | ICD-10-CM

## 2022-05-08 DIAGNOSIS — Z88.5 ALLERGY STATUS TO NARCOTIC AGENT: ICD-10-CM

## 2022-05-08 DIAGNOSIS — Z90.79 ACQUIRED ABSENCE OF OTHER GENITAL ORGAN(S): Chronic | ICD-10-CM

## 2022-05-08 DIAGNOSIS — Z90.49 ACQUIRED ABSENCE OF OTHER SPECIFIED PARTS OF DIGESTIVE TRACT: Chronic | ICD-10-CM

## 2022-05-08 DIAGNOSIS — Z98.890 OTHER SPECIFIED POSTPROCEDURAL STATES: Chronic | ICD-10-CM

## 2022-05-08 DIAGNOSIS — M19.90 UNSPECIFIED OSTEOARTHRITIS, UNSPECIFIED SITE: ICD-10-CM

## 2022-05-08 DIAGNOSIS — E78.5 HYPERLIPIDEMIA, UNSPECIFIED: ICD-10-CM

## 2022-05-08 PROCEDURE — 99284 EMERGENCY DEPT VISIT MOD MDM: CPT

## 2022-05-08 PROCEDURE — 73030 X-RAY EXAM OF SHOULDER: CPT | Mod: 26,LT

## 2022-05-08 RX ORDER — METHOCARBAMOL 500 MG/1
750 TABLET, FILM COATED ORAL ONCE
Refills: 0 | Status: COMPLETED | OUTPATIENT
Start: 2022-05-08 | End: 2022-05-08

## 2022-05-08 RX ADMIN — METHOCARBAMOL 750 MILLIGRAM(S): 500 TABLET, FILM COATED ORAL at 15:53

## 2022-05-08 NOTE — ED PROVIDER NOTE - CARE PROVIDERS DIRECT ADDRESSES
[email or web address]

## 2022-05-08 NOTE — ED PROVIDER NOTE - PATIENT PORTAL LINK FT
You can access the FollowMyHealth Patient Portal offered by [facility] by registering at the following website: [email or web address]. By joining [employer]’s FollowMyHealth portal, you will also be able to view your health information using other applications (apps) compatible with our system.

## 2022-05-08 NOTE — ED PROVIDER NOTE - OBJECTIVE STATEMENT
71M hx of hyperlipidemia presents with L shoulder pain, notes that he was moving furniture a few hours ago when he "yanked it," notes he did something similar 1 month ago with right shoulder, able to move it but now has pain with extended rom, denies falling/head trauma.

## 2022-05-08 NOTE — ED ADULT NURSE NOTE - NSIMPLEMENTINTERV_GEN_ALL_ED
Implemented All Universal Safety Interventions:  [place] to call system. Call bell, personal items and telephone within reach. Instruct patient to call for assistance. Room bathroom lighting operational. Non-slip footwear when patient is off stretcher. Physically safe environment: no spills, clutter or unnecessary equipment. Stretcher in lowest position, wheels locked, appropriate side rails in place.

## 2022-05-08 NOTE — ED PROVIDER NOTE - CARE PLAN
Principal Discharge DX:	Left shoulder pain   1 Patient with one or more new problems requiring additional work-up/treatment.

## 2022-05-08 NOTE — ED PROVIDER NOTE - ATTENDING CONTRIBUTION TO CARE
71-year-old male here for evaluation of shoulder pain.  Patient reports has been moving furniture the past 3 hours and now started having pain.  Patient had similar episode of pain to his right shoulder.  Patient reports that he is able to move it but with pain no trauma fever chills.  Gross above MSK exam.  Impression  The patient is here for left shoulder pain, x-ray shows arthritic changes patient placed in sling outpatient follow-up advised.

## 2022-05-08 NOTE — ED PROVIDER NOTE - NSFOLLOWUPINSTRUCTIONS_ED_ALL_ED_FT
Rotator Cuff Tendinitis    Rotator cuff tendinitis is inflammation of the tough, cord-like bands that connect muscle to bone (tendons) in the rotator cuff. The rotator cuff includes all of the muscles and tendons that connect the arm to the shoulder. The rotator cuff holds the head of the upper arm bone (humerus) in the cup (fossa) of the shoulder blade (scapula).    ImageThis condition can lead to a long-lasting (chronic) tear. The tear may be partial or complete.    What are the causes?  This condition is usually caused by overusing the rotator cuff.    What increases the risk?  This condition is more likely to develop in athletes and workers who frequently use their shoulder or reach over their heads. This can include activities such as:    Tennis.  Baseball or softball.  Swimming.  Construction work.  Painting.    What are the signs or symptoms?  Symptoms of this condition include:    Pain spreading (radiating) from the shoulder to the upper arm.  Swelling and tenderness in front of the shoulder.  Pain when reaching, pulling, or lifting the arm above the head.  Pain when lowering the arm from above the head.  Minor pain in the shoulder when resting.  Increased pain in the shoulder at night.  Difficulty placing the arm behind the back.    How is this diagnosed?  This condition is diagnosed with a medical history and physical exam. Tests may also be done, including:    X-rays.  MRI.  Ultrasounds.  CT or MR arthrogram. During this test, a contrast material is injected and then images are taken.    How is this treated?  Treatment for this condition depends on the severity of the condition. In less severe cases, treatment may include:    Rest. This may be done with a sling that holds the shoulder still (immobilization). Your health care provider may also recommend avoiding activities that involve lifting your arm over your head.  Icing the shoulder.  Anti-inflammatory medicines, such as aspirin or ibuprofen.    In more severe cases, treatment may include:    Physical therapy.  Steroid injections.  Surgery.    Follow these instructions at home:  If you have a sling:     Wear the sling as told by your health care provider. Remove it only as told by your health care provider.  Loosen the sling if your fingers tingle, become numb, or turn cold and blue.  Keep the sling clean.  If the sling is not waterproof, do not let it get wet. Remove it, if allowed, or cover it with a watertight covering when you take a bath or shower.  Managing pain, stiffness, and swelling     If directed, put ice on the injured area.    If you have a removable sling, remove it as told by your health care provider.  Put ice in a plastic bag.  Place a towel between your skin and the bag.  Leave the ice on for 20 minutes, 2–3 times a day.    Move your fingers often to avoid stiffness and to lessen swelling.  Raise (elevate) the injured area above the level of your heart while you are lying down.  Find a comfortable sleeping position or sleep on a recliner, if available.  Driving     Do not drive or use heavy machinery while taking prescription pain medicine.  Ask your health care provider when it is safe to drive if you have a sling on your arm.  Activity     Rest your shoulder as told by your health care provider.  Return to your normal activities as told by your health care provider. Ask your health care provider what activities are safe for you.  Do any exercises or stretches as told by your health care provider.  If you do repetitive overhead tasks, take small breaks in between and include stretching exercises as told by your health care provider.  General instructions     Do not use any products that contain nicotine or tobacco, such as cigarettes and e-cigarettes. These can delay healing. If you need help quitting, ask your health care provider.  Take over-the-counter and prescription medicines only as told by your health care provider.  Keep all follow-up visits as told by your health care provider. This is important.    Contact a health care provider if:  Your pain gets worse.  You have new pain in your arm, hands, or fingers.  Your pain is not relieved with medicine or does not get better after 6 weeks of treatment.  You have cracking sensations when moving your shoulder in certain directions.  You hear a snapping sound after using your shoulder, followed by severe pain and weakness.  Get help right away if:  Your arm, hand, or fingers are numb or tingling.  Your arm, hand, or fingers are swollen or painful or they turn white or blue.    Summary  Rotator cuff tendinitis is inflammation of the tough, cord-like bands that connect muscle to bone (tendons) in the rotator cuff.  This condition is usually caused by overusing the rotator cuff, which includes all of the muscles and tendons that connect the arm to the shoulder.  This condition is more likely to develop in athletes and workers who frequently use their shoulder or reach over their heads.  Treatment generally includes rest, anti-inflammatory medicines, and icing. In some cases, physical therapy and steroid injections may be needed. In severe cases, surgery may be needed.

## 2022-05-08 NOTE — ED PROVIDER NOTE - CLINICAL SUMMARY MEDICAL DECISION MAKING FREE TEXT BOX
The patient is here for left shoulder pain, x-ray shows arthritic changes patient placed in sling outpatient follow-up advised.

## 2022-05-08 NOTE — ED PROVIDER NOTE - PHYSICAL EXAMINATION
Vital Signs: I have reviewed the initial vital signs.  Constitutional: appears stated age, no acute distress.  HEENT: Airway patent, moist MM, no erythema/swelling/deformity of oral structures. EOMI, PERRLA.  CV: regular rate, regular rhythm, well-perfused extremities, 2+ b/l DP and radial pulses equal.  Lungs: BCTA, no increased WOB.  ABD: soft, NTND, no guarding or rebound, no pulsatile mass, no hernias, no flank pain.   MSK: Neck supple, nontender, nl ROM, no stepoff. Chest nontender. Back nontender in TLS spine or to b/l bony structures. mild ttp over L superior shoulder, + carter and + neer, pain with extension/flexion/abduction against resistance, able to touch opposite shoulder independently.   INTEG: Skin warm, dry, no rash.  NEURO: A&Ox3, moving all extremities, normal speech  PSYCH: Calm, cooperative, normal affect and interaction.

## 2022-05-08 NOTE — ED ADULT TRIAGE NOTE - HEART RATE (BEATS/MIN)
[facility] is committed to your overall health.  To help you get the most out of each of your visits, we will review your information to make sure you are up to date on all of your recommended tests and/or procedures.       Your PCP  LOUISE Packer   found that you may be due for:       Health Maintenance Due   Topic Date Due    Lipid Panel  1989    TETANUS VACCINE  08/03/2007    Influenza Vaccine  08/01/2017             If you have had any of the above done at another facility, please bring the records or information with you so that your record at [facility] will be complete.  If you would like to schedule any of these, please contact me.     If you are currently taking medication, please bring it with you to your appointment for review.     Also, if you have any type of Advanced Directives, please bring them with you to your office visit so we may scan them into your chart.     Thank you for Choosing [facility] for your healthcare needs.      Additional Information  If you have questions, you can email [email or web address] or call [phone]  to talk to our [facility] staff. Remember, [facility] is NOT to be used for urgent needs. For medical emergencies, dial 911.       76

## 2022-05-08 NOTE — ED PROVIDER NOTE - NSFOLLOWUPCLINICS_GEN_ALL_ED_FT
[facility]  Orthpedic  [address], [place]   Phone: [phone]  Fax:   Follow Up Time: 4-6 Days

## 2022-05-08 NOTE — ED PROVIDER NOTE - CARE PROVIDER_API CALL
Familia Cavazos (MD)  Orthopaedic Surgery  [address] [place] [address]  Phone: [phone]  Fax: [phone]  Follow Up Time: 4-6 Days

## 2022-06-02 ENCOUNTER — RX RENEWAL (OUTPATIENT)
Age: 72
End: 2022-06-02

## 2022-06-03 ENCOUNTER — APPOINTMENT (OUTPATIENT)
Dept: NEUROSURGERY | Facility: CLINIC | Age: 72
End: 2022-06-03
Payer: MEDICARE

## 2022-06-03 PROCEDURE — 99442: CPT

## 2022-07-01 ENCOUNTER — APPOINTMENT (OUTPATIENT)
Dept: PULMONOLOGY | Facility: CLINIC | Age: 72
End: 2022-07-01

## 2022-07-24 ENCOUNTER — EMERGENCY (EMERGENCY)
Facility: HOSPITAL | Age: 72
LOS: 0 days | Discharge: HOME | End: 2022-07-24
Attending: EMERGENCY MEDICINE | Admitting: EMERGENCY MEDICINE

## 2022-07-24 VITALS
HEART RATE: 85 BPM | OXYGEN SATURATION: 97 % | TEMPERATURE: 96 F | RESPIRATION RATE: 17 BRPM | DIASTOLIC BLOOD PRESSURE: 71 MMHG | SYSTOLIC BLOOD PRESSURE: 117 MMHG | HEIGHT: 65 IN
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
## 2022-07-24 DIAGNOSIS — F17.200 NICOTINE DEPENDENCE, UNSPECIFIED, UNCOMPLICATED: ICD-10-CM

## 2022-07-24 DIAGNOSIS — Z98.890 OTHER SPECIFIED POSTPROCEDURAL STATES: Chronic | ICD-10-CM
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
## 2022-07-24 DIAGNOSIS — Z88.5 ALLERGY STATUS TO NARCOTIC AGENT: ICD-10-CM

## 2022-07-24 DIAGNOSIS — Z90.79 ACQUIRED ABSENCE OF OTHER GENITAL ORGAN(S): Chronic | ICD-10-CM

## 2022-07-24 DIAGNOSIS — Z90.49 ACQUIRED ABSENCE OF OTHER SPECIFIED PARTS OF DIGESTIVE TRACT: Chronic | ICD-10-CM

## 2022-07-24 DIAGNOSIS — S93.402A SPRAIN OF UNSPECIFIED LIGAMENT OF LEFT ANKLE, INITIAL ENCOUNTER: ICD-10-CM

## 2022-07-24 DIAGNOSIS — M25.572 PAIN IN LEFT ANKLE AND JOINTS OF LEFT FOOT: ICD-10-CM

## 2022-07-24 DIAGNOSIS — Z88.6 ALLERGY STATUS TO ANALGESIC AGENT: ICD-10-CM

## 2022-07-24 DIAGNOSIS — Z90.5 ACQUIRED ABSENCE OF KIDNEY: Chronic | ICD-10-CM

## 2022-07-24 PROCEDURE — 99283 EMERGENCY DEPT VISIT LOW MDM: CPT | Mod: 25

## 2022-07-24 PROCEDURE — 73610 X-RAY EXAM OF ANKLE: CPT | Mod: 26,LT

## 2022-07-24 PROCEDURE — 29515 APPLICATION SHORT LEG SPLINT: CPT

## 2022-07-24 RX ORDER — ACETAMINOPHEN 500 MG
975 TABLET ORAL ONCE
Refills: 0 | Status: COMPLETED | OUTPATIENT
Start: 2022-07-24 | End: 2022-07-24

## 2022-07-24 RX ADMIN — Medication 975 MILLIGRAM(S): at 20:25

## 2022-07-24 NOTE — ED ADULT TRIAGE NOTE - PAIN: PRESENCE, MLM
complains of pain/discomfort Odomzo Counseling- I discussed with the patient the risks of Odomzo including but not limited to nausea, vomiting, diarrhea, constipation, weight loss, changes in the sense of taste, decreased appetite, muscle spasms, and hair loss.  The patient verbalized understanding of the proper use and possible adverse effects of Odomzo.  All of the patient's questions and concerns were addressed.

## 2022-07-24 NOTE — ED PROVIDER NOTE - OBJECTIVE STATEMENT
71-year-old male, who was stepping into a swimming pool and twisted left ankle prior to arrival.  No other injuries.

## 2022-07-24 NOTE — ED PROVIDER NOTE - CLINICAL SUMMARY MEDICAL DECISION MAKING FREE TEXT BOX
X-ray left ankle positive degenerative changes, no acute fracture or dislocation.  Posterior splint applied and given crutches.  Will refer to Ortho.

## 2022-07-24 NOTE — ED PROVIDER NOTE - NSFOLLOWUPINSTRUCTIONS_ED_ALL_ED_FT
Ankle Sprain    WHAT YOU NEED TO KNOW:    An ankle sprain happens when 1 or more ligaments in your ankle joint stretch or tear. Ligaments are tough tissues that connect bones. Ligaments support your joints and keep your bones in place.    DISCHARGE INSTRUCTIONS:    Return to the emergency department if:     You have severe pain in your ankle.      Your foot or toes are cold or numb.      Your ankle becomes more weak or unstable (wobbly).      You are unable to put any weight on your ankle or foot.      Your swelling has increased or returned.    Contact your healthcare provider if:     Your pain does not go away, even after treatment.      You have questions or concerns about your condition or care.    Medicines: You may need any of the following:     NSAIDs, such as ibuprofen, help decrease swelling, pain, and fever. This medicine is available with or without a doctor's order. NSAIDs can cause stomach bleeding or kidney problems in certain people. If you take blood thinner medicine, always ask your healthcare provider if NSAIDs are safe for you. Always read the medicine label and follow directions.      Acetaminophen decreases pain. It is available without a doctor's order. Ask how much to take and how often to take it. Follow directions. Acetaminophen can cause liver damage if not taken correctly.      Prescription pain medicine may be given. Ask how to take this medicine safely.      Take your medicine as directed. Contact your healthcare provider if you think your medicine is not helping or if you have side effects. Tell him or her if you are allergic to any medicine. Keep a list of the medicines, vitamins, and herbs you take. Include the amounts, and when and why you take them. Bring the list or the pill bottles to follow-up visits. Carry your medicine list with you in case of an emergency.    Self care:     Use support devices, such as a brace, cast, or splint, may be needed to limit your movement and protect your joint. You may need to use crutches to decrease your pain as you move around.       Go to physical therapy as directed. A physical therapist teaches you exercises to help improve movement and strength, and to decrease pain.      Rest your ankle so that it can heal. Return to normal activities as directed.      Apply ice on your ankle for 15 to 20 minutes every hour or as directed. Use an ice pack, or put crushed ice in a plastic bag. Cover it with a towel. Ice helps prevent tissue damage and decreases swelling and pain.      Compress your ankle. Ask if you should wrap an elastic bandage around your injured ligament. An elastic bandage provides support and helps decrease swelling and movement so your joint can heal. Wear as long as directed.      Elevate your ankle above the level of your heart as often as you can. This will help decrease swelling and pain. Prop your ankle on pillows or blankets to keep it elevated comfortably. Elevate Limb         Prevent another ankle sprain:     Let your ankle heal. Find out how long your ligament needs to heal. Do not do any physical activity until your healthcare provider says it is okay. If you start activity too soon, you may develop a more serious injury.      Always warm up and stretch before you exercise or play sports.      Use the right equipment. Always wear shoes that fit well and are made for the activity that you are doing. You may also need ankle supports, elbow and knee pads, or braces.    Follow up with your healthcare provider as directed: Write down your questions so you remember to ask them during your visits.        © Copyright "[employer]" 2019 All illustrations and images included in CareNotes are the copyrighted property of [employer] or [employer].

## 2022-07-24 NOTE — ED PROVIDER NOTE - CARE PROVIDER_API CALL
Familia Cavazos (MD)  Orthopaedic Surgery  [address] [place] [address]  Phone: [phone]  Fax: [phone]  Follow Up Time: 1-3 Days

## 2022-07-24 NOTE — ED PROVIDER NOTE - NS ED ROS FT
Review of Systems:  	•	CONSTITUTIONAL - no fever, no diaphoresis, no weight change  	•	SKIN - no rash  	•	HEMATOLOGIC - no bleeding, no bruising  	•	EYES - no eye pain, no blurred vision  	•	ENT - no change in hearing, no pain  	•	RESPIRATORY - no shortness of breath, no cough  	•	CARDIAC - no chest pain, no palpitations  	•	GI - no abd pain, no nausea, no vomiting, no diarrhea, no constipation, no bleeding  	•	GENITO-URINARY - no discharge, no dysuria; no hematuria, LMP-   	•	ENDO - no polydypsia, no polyurea, no heat/no cold intolerance  	•	MUSCULOSKELETAL - + joint paint, no swelling, no redness  	•	NEUROLOGIC - no weakness, no headache, no anesthesia, no paresthesias  	•	PSYCH - no anxiety, non suicidal, non homicidal, no hallucination, no depression  	•	ALLERGY - no rhinitis

## 2022-07-24 NOTE — ED PROVIDER NOTE - PATIENT PORTAL LINK FT
You can access the FollowMyHealth Patient Portal offered by [facility] by registering at the following website: [email or web address]. By joining [facility]’s FollowMyHealth portal, you will also be able to view your health information using other applications (apps) compatible with our system.

## 2022-07-24 NOTE — ED PROVIDER NOTE - PHYSICAL EXAMINATION
VITAL SIGNS: I have reviewed nursing notes and confirm.  CONSTITUTIONAL: Well-developed; well-nourished; in no acute distress.  SKIN: Skin exam is warm and dry, no acute rash.  HEAD: Normocephalic; atraumatic.  EYES: PERRL, EOM intact; conjunctiva and sclera clear.  ENT: No nasal discharge; airway clear. Throat clear.  NECK: Supple; non tender.  No lymphadenopathy.  CARD: S1, S2 normal; no murmurs, gallops, or rubs. Regular rate and rhythm.  RESP: No wheezes, rales or rhonchi.  ABD: Normal bowel sounds; soft; non-distended; non-tender; no hepatosplenomegaly.  EXT: Normal ROM. No clubbing, cyanosis or edema, swollen tender left ankle  NEURO: Alert, oriented. Grossly unremarkable. No focal deficits.

## 2022-07-27 ENCOUNTER — APPOINTMENT (OUTPATIENT)
Dept: PAIN MANAGEMENT | Facility: CLINIC | Age: 72
End: 2022-07-27

## 2022-07-28 ENCOUNTER — APPOINTMENT (OUTPATIENT)
Dept: ORTHOPEDIC SURGERY | Facility: CLINIC | Age: 72
End: 2022-07-28

## 2022-07-28 VITALS — WEIGHT: 132 LBS | HEIGHT: 65 IN | BODY MASS INDEX: 21.99 KG/M2

## 2022-07-28 DIAGNOSIS — Z00.00 ENCOUNTER FOR GENERAL ADULT MEDICAL EXAMINATION W/OUT ABNORMAL FINDINGS: ICD-10-CM

## 2022-07-28 DIAGNOSIS — S93.492A SPRAIN OF OTHER LIGAMENT OF LEFT ANKLE, INITIAL ENCOUNTER: ICD-10-CM

## 2022-07-28 PROCEDURE — 99203 OFFICE O/P NEW LOW 30 MIN: CPT

## 2022-07-28 RX ORDER — DICYCLOMINE HYDROCHLORIDE 20 MG/1
20 TABLET ORAL
Qty: 90 | Refills: 0 | Status: ACTIVE | COMMUNITY
Start: 2022-05-09

## 2022-07-28 RX ORDER — PREDNISOLONE ACETATE 10 MG/ML
1 SUSPENSION/ DROPS OPHTHALMIC
Qty: 5 | Refills: 0 | Status: ACTIVE | COMMUNITY
Start: 2022-07-14

## 2022-07-28 RX ORDER — BETHANECHOL CHLORIDE 25 MG/1
25 TABLET ORAL
Qty: 270 | Refills: 0 | Status: ACTIVE | COMMUNITY
Start: 2022-01-03

## 2022-07-28 RX ORDER — GABAPENTIN 400 MG/1
400 CAPSULE ORAL
Qty: 120 | Refills: 0 | Status: ACTIVE | COMMUNITY
Start: 2022-06-08

## 2022-07-28 RX ORDER — FAMOTIDINE 40 MG/1
40 TABLET, FILM COATED ORAL
Qty: 90 | Refills: 0 | Status: ACTIVE | COMMUNITY
Start: 2021-11-08

## 2022-07-28 NOTE — DISCUSSION/SUMMARY
[de-identified] :  discussed x-rays patient, negative for acute fracture.  Patient has bad ankle sprain.  Discussed in detail with patient.  Discussed treatment options patient including tall cam walker boot weight-bearing as tolerated, use crutches if needed.  Patient states he does not want to wear tall cam walker boot.  Discussed possible consequences of not wearing tall cam walker boot.  Placed patient in air cast to be worn with stable sneakers.  Use crutches if needed.  Follow-up in 3-4 weeks for re-evaluation.  Call if any questions or concerns.  Patient understands agrees with plan.  Seen under supervision of Dr. Cantu.

## 2022-07-28 NOTE — PHYSICAL EXAM
[Left] : left foot and ankle [] : negative anterior drawer at ankle [FreeTextEntry9] :  good range of motion with pain to lateral ligaments [de-identified] :  good strength throughout

## 2022-07-28 NOTE — REVIEW OF SYSTEMS
Spoke with patient regarding possible ascending aortic aneurysm. Will see how it is when he has another CT in 6 months for his pulmonary disease. It appears to be very stable over the last couple of years   [Negative] : Heme/Lymph [FreeTextEntry2] :   Cancer [FreeTextEntry7] :  acid reflux

## 2022-07-28 NOTE — HISTORY OF PRESENT ILLNESS
[de-identified] :  patient is a 71-year-old male here for left ankle sprain.  Patient states on 07/24/2022  he was stepping off of a step in the ball.  Patient states he twisted his left ankle.  Patient was immediate pain and went to the emergency room.  Patient states at the emergency room x-rays were taken patient was told he had ankle sprain, was placed in a splint and told to follow up Orthopedics.  Patient denies numbness and tingling.

## 2022-07-28 NOTE — DATA REVIEWED
[Left] : left [Ankle] : ankle [FreeTextEntry1] :  no acute fracture, subluxation or dislocation.  Degenerative changes noted

## 2022-10-03 ENCOUNTER — APPOINTMENT (OUTPATIENT)
Dept: ORTHOPEDIC SURGERY | Facility: CLINIC | Age: 72
End: 2022-10-03

## 2022-10-11 ENCOUNTER — NON-APPOINTMENT (OUTPATIENT)
Age: 72
End: 2022-10-11

## 2022-10-24 ENCOUNTER — APPOINTMENT (OUTPATIENT)
Dept: UROLOGY | Facility: CLINIC | Age: 72
End: 2022-10-24

## 2022-11-11 ENCOUNTER — APPOINTMENT (OUTPATIENT)
Dept: ORTHOPEDIC SURGERY | Facility: CLINIC | Age: 72
End: 2022-11-11

## 2023-01-23 ENCOUNTER — EMERGENCY (EMERGENCY)
Facility: HOSPITAL | Age: 73
LOS: 0 days | Discharge: HOME | End: 2023-01-23
Attending: EMERGENCY MEDICINE | Admitting: EMERGENCY MEDICINE
Payer: COMMERCIAL

## 2023-01-23 VITALS
RESPIRATION RATE: 18 BRPM | TEMPERATURE: 97 F | HEART RATE: 88 BPM | SYSTOLIC BLOOD PRESSURE: 110 MMHG | DIASTOLIC BLOOD PRESSURE: 68 MMHG | OXYGEN SATURATION: 96 %

## 2023-01-23 VITALS
DIASTOLIC BLOOD PRESSURE: 72 MMHG | OXYGEN SATURATION: 95 % | HEART RATE: 95 BPM | WEIGHT: 132.06 LBS | RESPIRATION RATE: 19 BRPM | TEMPERATURE: 97 F | SYSTOLIC BLOOD PRESSURE: 107 MMHG

## 2023-01-23 DIAGNOSIS — Z88.6 ALLERGY STATUS TO ANALGESIC AGENT: ICD-10-CM

## 2023-01-23 DIAGNOSIS — Z87.19 PERSONAL HISTORY OF OTHER DISEASES OF THE DIGESTIVE SYSTEM: ICD-10-CM

## 2023-01-23 DIAGNOSIS — F17.210 NICOTINE DEPENDENCE, CIGARETTES, UNCOMPLICATED: ICD-10-CM

## 2023-01-23 DIAGNOSIS — Z98.890 OTHER SPECIFIED POSTPROCEDURAL STATES: Chronic | ICD-10-CM

## 2023-01-23 DIAGNOSIS — R10.9 UNSPECIFIED ABDOMINAL PAIN: ICD-10-CM

## 2023-01-23 DIAGNOSIS — J44.9 CHRONIC OBSTRUCTIVE PULMONARY DISEASE, UNSPECIFIED: ICD-10-CM

## 2023-01-23 DIAGNOSIS — Z85.528 PERSONAL HISTORY OF OTHER MALIGNANT NEOPLASM OF KIDNEY: ICD-10-CM

## 2023-01-23 DIAGNOSIS — R91.1 SOLITARY PULMONARY NODULE: ICD-10-CM

## 2023-01-23 DIAGNOSIS — Z90.5 ACQUIRED ABSENCE OF KIDNEY: Chronic | ICD-10-CM

## 2023-01-23 DIAGNOSIS — Z90.49 ACQUIRED ABSENCE OF OTHER SPECIFIED PARTS OF DIGESTIVE TRACT: Chronic | ICD-10-CM

## 2023-01-23 DIAGNOSIS — Z90.79 ACQUIRED ABSENCE OF OTHER GENITAL ORGAN(S): Chronic | ICD-10-CM

## 2023-01-23 DIAGNOSIS — Z87.438 PERSONAL HISTORY OF OTHER DISEASES OF MALE GENITAL ORGANS: ICD-10-CM

## 2023-01-23 DIAGNOSIS — Z88.8 ALLERGY STATUS TO OTHER DRUGS, MEDICAMENTS AND BIOLOGICAL SUBSTANCES: ICD-10-CM

## 2023-01-23 DIAGNOSIS — Z86.79 PERSONAL HISTORY OF OTHER DISEASES OF THE CIRCULATORY SYSTEM: ICD-10-CM

## 2023-01-23 DIAGNOSIS — Z90.49 ACQUIRED ABSENCE OF OTHER SPECIFIED PARTS OF DIGESTIVE TRACT: ICD-10-CM

## 2023-01-23 DIAGNOSIS — Z90.5 ACQUIRED ABSENCE OF KIDNEY: ICD-10-CM

## 2023-01-23 LAB
ALBUMIN SERPL ELPH-MCNC: 3.7 G/DL — SIGNIFICANT CHANGE UP (ref 3.5–5.2)
ALP SERPL-CCNC: 97 U/L — SIGNIFICANT CHANGE UP (ref 30–115)
ALT FLD-CCNC: 13 U/L — SIGNIFICANT CHANGE UP (ref 0–41)
ANION GAP SERPL CALC-SCNC: 8 MMOL/L — SIGNIFICANT CHANGE UP (ref 7–14)
AST SERPL-CCNC: 17 U/L — SIGNIFICANT CHANGE UP (ref 0–41)
BASOPHILS # BLD AUTO: 0.04 K/UL — SIGNIFICANT CHANGE UP (ref 0–0.2)
BASOPHILS NFR BLD AUTO: 0.5 % — SIGNIFICANT CHANGE UP (ref 0–1)
BILIRUB SERPL-MCNC: 0.3 MG/DL — SIGNIFICANT CHANGE UP (ref 0.2–1.2)
BUN SERPL-MCNC: 20 MG/DL — SIGNIFICANT CHANGE UP (ref 10–20)
CALCIUM SERPL-MCNC: 9.4 MG/DL — SIGNIFICANT CHANGE UP (ref 8.4–10.5)
CHLORIDE SERPL-SCNC: 103 MMOL/L — SIGNIFICANT CHANGE UP (ref 98–110)
CO2 SERPL-SCNC: 26 MMOL/L — SIGNIFICANT CHANGE UP (ref 17–32)
CREAT SERPL-MCNC: 1.5 MG/DL — SIGNIFICANT CHANGE UP (ref 0.7–1.5)
EGFR: 49 ML/MIN/1.73M2 — LOW
EOSINOPHIL # BLD AUTO: 0.28 K/UL — SIGNIFICANT CHANGE UP (ref 0–0.7)
EOSINOPHIL NFR BLD AUTO: 3.4 % — SIGNIFICANT CHANGE UP (ref 0–8)
GLUCOSE SERPL-MCNC: 83 MG/DL — SIGNIFICANT CHANGE UP (ref 70–99)
HCT VFR BLD CALC: 44.2 % — SIGNIFICANT CHANGE UP (ref 42–52)
HGB BLD-MCNC: 14.4 G/DL — SIGNIFICANT CHANGE UP (ref 14–18)
IMM GRANULOCYTES NFR BLD AUTO: 0.2 % — SIGNIFICANT CHANGE UP (ref 0.1–0.3)
LYMPHOCYTES # BLD AUTO: 1.44 K/UL — SIGNIFICANT CHANGE UP (ref 1.2–3.4)
LYMPHOCYTES # BLD AUTO: 17.3 % — LOW (ref 20.5–51.1)
MCHC RBC-ENTMCNC: 29.3 PG — SIGNIFICANT CHANGE UP (ref 27–31)
MCHC RBC-ENTMCNC: 32.6 G/DL — SIGNIFICANT CHANGE UP (ref 32–37)
MCV RBC AUTO: 90 FL — SIGNIFICANT CHANGE UP (ref 80–94)
MONOCYTES # BLD AUTO: 0.67 K/UL — HIGH (ref 0.1–0.6)
MONOCYTES NFR BLD AUTO: 8.1 % — SIGNIFICANT CHANGE UP (ref 1.7–9.3)
NEUTROPHILS # BLD AUTO: 5.86 K/UL — SIGNIFICANT CHANGE UP (ref 1.4–6.5)
NEUTROPHILS NFR BLD AUTO: 70.5 % — SIGNIFICANT CHANGE UP (ref 42.2–75.2)
NRBC # BLD: 0 /100 WBCS — SIGNIFICANT CHANGE UP (ref 0–0)
PLATELET # BLD AUTO: 295 K/UL — SIGNIFICANT CHANGE UP (ref 130–400)
POTASSIUM SERPL-MCNC: 4.8 MMOL/L — SIGNIFICANT CHANGE UP (ref 3.5–5)
POTASSIUM SERPL-SCNC: 4.8 MMOL/L — SIGNIFICANT CHANGE UP (ref 3.5–5)
PROT SERPL-MCNC: 7 G/DL — SIGNIFICANT CHANGE UP (ref 6–8)
RBC # BLD: 4.91 M/UL — SIGNIFICANT CHANGE UP (ref 4.7–6.1)
RBC # FLD: 13.2 % — SIGNIFICANT CHANGE UP (ref 11.5–14.5)
SODIUM SERPL-SCNC: 137 MMOL/L — SIGNIFICANT CHANGE UP (ref 135–146)
WBC # BLD: 8.31 K/UL — SIGNIFICANT CHANGE UP (ref 4.8–10.8)
WBC # FLD AUTO: 8.31 K/UL — SIGNIFICANT CHANGE UP (ref 4.8–10.8)

## 2023-01-23 PROCEDURE — 74177 CT ABD & PELVIS W/CONTRAST: CPT | Mod: 26,MA

## 2023-01-23 PROCEDURE — 99284 EMERGENCY DEPT VISIT MOD MDM: CPT

## 2023-01-23 RX ORDER — IBUPROFEN 200 MG
600 TABLET ORAL ONCE
Refills: 0 | Status: COMPLETED | OUTPATIENT
Start: 2023-01-23 | End: 2023-01-23

## 2023-01-23 RX ORDER — SODIUM CHLORIDE 9 MG/ML
1000 INJECTION INTRAMUSCULAR; INTRAVENOUS; SUBCUTANEOUS ONCE
Refills: 0 | Status: COMPLETED | OUTPATIENT
Start: 2023-01-23 | End: 2023-01-23

## 2023-01-23 RX ADMIN — SODIUM CHLORIDE 2000 MILLILITER(S): 9 INJECTION INTRAMUSCULAR; INTRAVENOUS; SUBCUTANEOUS at 10:46

## 2023-01-23 RX ADMIN — Medication 600 MILLIGRAM(S): at 10:45

## 2023-01-23 NOTE — ED PROVIDER NOTE - NSFOLLOWUPINSTRUCTIONS_ED_ALL_ED_FT
Please follow up with a Pulmonologist or your Primary Care Provider for further evaluation. Continue Motrin / Tylenol for pain management.     Groin Pain    WHAT YOU NEED TO KNOW:    Groin pain may be felt only in your groin, or it may spread to your buttocks, thigh, or knee. An injury to your hip joint, pelvic area, lower back, or thighs can cause groin pain.    DISCHARGE INSTRUCTIONS:    Medicines: You may need any of the following:     NSAIDs, such as ibuprofen, help decrease swelling, pain, and fever. This medicine is available with or without a doctor's order. NSAIDs can cause stomach bleeding or kidney problems in certain people. If you take blood thinner medicine, always ask if NSAIDs are safe for you. Always read the medicine label and follow directions. Do not give these medicines to children under 6 months of age without direction from your child's healthcare provider.    Acetaminophen decreases pain. It is available without a doctor's order. Ask how much to take and how often to take it. Follow directions. Acetaminophen can cause liver damage if not taken correctly.     Take your medicine as directed. Contact your healthcare provider if you think your medicine is not helping or if you have side effects. Tell him of her if you are allergic to any medicine. Keep a list of the medicines, vitamins, and herbs you take. Include the amounts, and when and why you take them. Bring the list or the pill bottles to follow-up visits. Carry your medicine list with you in case of an emergency.    Follow up with your healthcare provider as directed: You may need to return for more tests. Write down your questions so you remember to ask them during your visits.     Self-care:     Rest as much as possible. Avoid activities that cause or increase your pain.    Apply ice on your groin for 15 to 20 minutes every hour or as directed. Use an ice pack, or put crushed ice in a plastic bag. Cover it with a towel. Ice helps prevent tissue damage and decreases swelling and pain.     Apply heat on your groin for 20 to 30 minutes every 2 hours for as many days as directed. Heat helps decrease pain and muscle spasms.     Contact your healthcare provider if:     You have a fever.     You have questions or concerns about your condition or care.    Return to the emergency department if:     You have severe pain even after you take medicine.     You have pain or burning when you urinate.     You have pain on your side that spreads to your groin.        Pulmonary Nodule  A pulmonary nodule is a small, round growth of tissue in the lung. It is sometimes referred to as a "shadow" or "spot on the lung." Nodules range in size from less than 1/5 of an inch (4 mm) to a little bigger than an inch (30 mm).    Pulmonary nodules can be either noncancerous (benign) or cancerous (malignant). Most are noncancerous. Smaller nodules in people who do not smoke and do not have any other risk factors for lung cancer are more likely to be noncancerous. Larger, irregular nodules in people who smoke or who have a strong family history of lung cancer are more likely to be cancerous.    What are the causes?  This condition may be caused by:    A bacterial, fungal, or viral infection, such as tuberculosis. The infection is usually an old and inactive one.  A noncancerous mass of tissue.  Inflammation from conditions such as rheumatoid arthritis.  Abnormal blood vessels in the lungs.  Cancerous tissue, such as lung cancer or a cancer in another part of the body that has spread to the lung.    What are the signs or symptoms?  This condition usually does not cause symptoms. If symptoms appear, they are usually related to the underlying cause. For example, if the condition is caused by an infection, you may have a cough or fever.    How is this diagnosed?  This condition is usually diagnosed with an X-ray or CT scan. To help determine whether a pulmonary nodule is benign or malignant, your health care provider will:    Take your medical history.  Perform a physical exam.  Order tests, including:    Blood tests.  A skin test called a tuberculin test. This test is done to check if you have been exposed to the germ that causes tuberculosis.  Chest X-rays.  A CT scan. This test shows smaller pulmonary nodules more clearly and with more detail than an X-ray.  A positron emission tomography (PET) scan. This test is done to check if the nodule is cancerous. During the test, a safe amount of a radioactive substance is injected into the bloodstream. Then a picture is taken.  Biopsy. In this test, a tiny piece of the pulmonary nodule is removed and then examined under a microscope.      How is this treated?  Treatment for this condition depends on whether the pulmonary nodule is malignant or benign as well as your risk of getting cancer.    Noncancerous nodules usually do not need to be treated, but they may need to be monitored with CT scans. If a CT scan shows that the pulmonary nodule got bigger, more tests may be done.  Some nodules need to be removed. If this is the case, you may have a procedure called a thoractomy. During the procedure, your health care provider will make an incision in your chest and remove the part of the lung where the nodule is located.    Follow these instructions at home:  Take over-the-counter and prescription medicines only as told by your health care provider.  Do not use any products that contain nicotine or tobacco, such as cigarettes and e-cigarettes. If you need help quitting, ask your health care provider.  ImageKeep all follow-up visits as told by your health care provider. This is important.    Contact a health care provider if:  You have trouble breathing when you are active.  You feel sick or unusually tired.  You do not feel like eating.  You lose weight without trying.  You develop chills or night sweats.  Get help right away if:  You cannot catch your breath.  You begin wheezing.  You cannot stop coughing.  You cough up blood.  You become dizzy or feel like you are going to faint.  You have sudden chest pain.  You have a fever or persistent symptoms for more than 2–3 days.  You have a fever and your symptoms suddenly get worse.    Summary  A pulmonary nodule is a small, round growth of tissue in the lung. Most pulmonary nodules are noncancerous.  This condition is usually diagnosed with an X-ray or CT scan.  Common causes of pulmonary nodules include infection, inflammation, and noncancerous growths.  Though less common, if a nodule is found to be cancerous, you will need specific diagnostic tests and treatment options as directed by your medical provider.  Treatment for this condition depends on whether the pulmonary nodule is benign or malignant as well as your risk of getting cancer.

## 2023-01-23 NOTE — ED PROVIDER NOTE - PROGRESS NOTE DETAILS
KA: Discussed CT results with pt. No acute findings relating to chief complaint of groin pain. Incidental findings of pulmonary nodule discussed with Pt. Pt was unaware and would like information to follow up for further evaluation. Will dc home with follow up/referral instructions. I have personally evaluated the patient myself and agree with fellow's plan.

## 2023-01-23 NOTE — ED PROVIDER NOTE - ATTENDING APP SHARED VISIT CONTRIBUTION OF CARE
Patient complains of groin pain.  On my examination there is a hard moderately tender 1 cm mass overlying the femoral pulse in the inguinal ligament.  My differential diagnosis includes a lymph node, a lipoma, other mass, as well as aneurysm since on my exam this is pulsatile.  I do not feel a hernia.  The testes are normal.  There is full range of motion at the hip.  CAT scan ordered to rule out vascular emergency.  CAT scan reviewed.  No abnormality seen at the region of the physical exam finding.  Patient referred to surgery for reevaluation of the mass in contemplation of possible biopsy.  Copies of all diagnostic testing provided to patient to aid in follow-up.  In my opinion, outpatient follow-up and treatment is medically appropriate.

## 2023-01-23 NOTE — ED PROVIDER NOTE - PROVIDER TOKENS
PROVIDER:[TOKEN:[[id number]]],FREE:[LAST:[Primary Care Provider],PHONE:[(   )    -],FAX:[(   )    -]]

## 2023-01-23 NOTE — ED PROVIDER NOTE - ADDITIONAL NOTES AND INSTRUCTIONS:
Mr. Delgado was seen in the Emergency Department on 1/23/2023 and should only return to work as tolerated by the date above.

## 2023-01-23 NOTE — ED PROVIDER NOTE - DIFFERENTIAL DIAGNOSIS
Aneurysm versus tender lymphadenopathy versus inguinal ligament sprain versus hernia versus bursitis. Differential Diagnosis

## 2023-01-23 NOTE — ED PROVIDER NOTE - PHYSICAL EXAMINATION
As Follows:  CONST: Well appearing in NAD. Sitting comfortably.   MS: Tenderness to the left groin. No external signs of hernia, not exposed with coughing. Normal ROM in the LLE aggravating pain.  SKIN: Warm, dry, no acute rashes. Good turgor

## 2023-01-23 NOTE — ED PROVIDER NOTE - PATIENT PORTAL LINK FT
You can access the FollowMyHealth Patient Portal offered by [facility] by registering at the following website: [email or web address]. By joining [employer]’s FollowMyHealth portal, you will also be able to view your health information using other applications (apps) compatible with our system.

## 2023-01-23 NOTE — ED PROVIDER NOTE - CARE PROVIDER_API CALL
Sam Stephen (DO)  Critical Care Medicine; Pulmonary Disease; Sleep Medicine  [address] [place] [address], [place] [address]  Phone: [phone]  Fax: [phone]  Follow Up Time:     Primary Care Provider,   Phone: (   )    -  Fax: (   )    -  Follow Up Time:

## 2023-01-23 NOTE — ED PROVIDER NOTE - OBJECTIVE STATEMENT
Pt is a 73 y/o male with PMHx of COPD and bilateral inguinal hernia repairs presenting for left sided groin pain after pt had an episode of coughing which resulted in a "pop" sensation and pain. He states the pain does not radiate but is aggravated by touch and movement/ambulation. He took tylenol at home PTA with minimal to no relief. He states he is able to take Ibuprofen and did not take at home PTA. He denies any further alleviating factors. He denies any other complaints of fever, chills, cough, sore throat, N/V/D/C, abdominal pain, CP, SoB, or urinary complaints.

## 2023-02-16 NOTE — ED PROVIDER NOTE - NSICDXPASTSURGICALHX_GEN_ALL_CORE_FT
PAST SURGICAL HISTORY:  H/O hernia repair     H/O left nephrectomy & 3 ribs    History of cholecystectomy     S/P TURP

## 2023-03-29 ENCOUNTER — RX RENEWAL (OUTPATIENT)
Age: 73
End: 2023-03-29

## 2023-05-09 NOTE — ED PROVIDER NOTE - INPATIENT RESIDENT/ACP NOTIFIED
[Clean] : clean [Dry] : dry [Intact] : intact [NL] : soft, not tender, not distended [Erythema] : no erythema [Granulation tissue] : no granulation tissue [Drainage] : no drainage Wicho GILLETTE

## 2023-07-05 ENCOUNTER — RX RENEWAL (OUTPATIENT)
Age: 73
End: 2023-07-05

## 2023-07-08 ENCOUNTER — RX RENEWAL (OUTPATIENT)
Age: 73
End: 2023-07-08

## 2023-07-08 RX ORDER — TADALAFIL 5 MG/1
5 TABLET ORAL
Qty: 90 | Refills: 3 | Status: ACTIVE | COMMUNITY
Start: 2021-02-25 | End: 1900-01-01

## 2023-10-09 ENCOUNTER — APPOINTMENT (OUTPATIENT)
Dept: UROLOGY | Facility: CLINIC | Age: 73
End: 2023-10-09
Payer: MEDICARE

## 2023-10-09 PROCEDURE — 99213 OFFICE O/P EST LOW 20 MIN: CPT

## 2023-10-10 ENCOUNTER — RESULT REVIEW (OUTPATIENT)
Age: 73
End: 2023-10-10

## 2023-10-10 ENCOUNTER — OUTPATIENT (OUTPATIENT)
Dept: OUTPATIENT SERVICES | Facility: HOSPITAL | Age: 73
LOS: 1 days | End: 2023-10-10
Payer: MEDICARE

## 2023-10-10 ENCOUNTER — LABORATORY RESULT (OUTPATIENT)
Age: 73
End: 2023-10-10

## 2023-10-10 DIAGNOSIS — Z90.5 ACQUIRED ABSENCE OF KIDNEY: Chronic | ICD-10-CM

## 2023-10-10 DIAGNOSIS — Z00.8 ENCOUNTER FOR OTHER GENERAL EXAMINATION: ICD-10-CM

## 2023-10-10 DIAGNOSIS — Z90.49 ACQUIRED ABSENCE OF OTHER SPECIFIED PARTS OF DIGESTIVE TRACT: Chronic | ICD-10-CM

## 2023-10-10 DIAGNOSIS — N40.1 BENIGN PROSTATIC HYPERPLASIA WITH LOWER URINARY TRACT SYMPTOMS: ICD-10-CM

## 2023-10-10 DIAGNOSIS — Z90.79 ACQUIRED ABSENCE OF OTHER GENITAL ORGAN(S): Chronic | ICD-10-CM

## 2023-10-10 PROCEDURE — 76770 US EXAM ABDO BACK WALL COMP: CPT

## 2023-10-10 PROCEDURE — 76770 US EXAM ABDO BACK WALL COMP: CPT | Mod: 26

## 2023-10-11 DIAGNOSIS — N40.1 BENIGN PROSTATIC HYPERPLASIA WITH LOWER URINARY TRACT SYMPTOMS: ICD-10-CM

## 2023-10-12 LAB
APPEARANCE: CLEAR
BACTERIA UR CULT: ABNORMAL
BILIRUBIN URINE: NEGATIVE
BLOOD URINE: NEGATIVE
COLOR: YELLOW
GLUCOSE QUALITATIVE U: NEGATIVE MG/DL
KETONES URINE: NEGATIVE MG/DL
LEUKOCYTE ESTERASE URINE: ABNORMAL
NITRITE URINE: NEGATIVE
PH URINE: 6
PROTEIN URINE: NEGATIVE MG/DL
PSA FREE FLD-MCNC: 22 %
PSA FREE SERPL-MCNC: 0.4 NG/ML
PSA SERPL-MCNC: 1.85 NG/ML
SPECIFIC GRAVITY URINE: 1.01
UROBILINOGEN URINE: 0.2 MG/DL

## 2023-10-25 ENCOUNTER — APPOINTMENT (OUTPATIENT)
Dept: PULMONOLOGY | Facility: CLINIC | Age: 73
End: 2023-10-25
Payer: MEDICARE

## 2023-10-25 VITALS
DIASTOLIC BLOOD PRESSURE: 70 MMHG | OXYGEN SATURATION: 95 % | HEIGHT: 63 IN | HEART RATE: 86 BPM | WEIGHT: 127 LBS | BODY MASS INDEX: 22.5 KG/M2 | SYSTOLIC BLOOD PRESSURE: 126 MMHG

## 2023-10-25 DIAGNOSIS — R91.1 SOLITARY PULMONARY NODULE: ICD-10-CM

## 2023-10-25 DIAGNOSIS — F17.210 NICOTINE DEPENDENCE, CIGARETTES, UNCOMPLICATED: ICD-10-CM

## 2023-10-25 DIAGNOSIS — J44.9 CHRONIC OBSTRUCTIVE PULMONARY DISEASE, UNSPECIFIED: ICD-10-CM

## 2023-10-25 DIAGNOSIS — Z86.79 PERSONAL HISTORY OF OTHER DISEASES OF THE CIRCULATORY SYSTEM: ICD-10-CM

## 2023-10-25 DIAGNOSIS — Z85.528 PERSONAL HISTORY OF OTHER MALIGNANT NEOPLASM OF KIDNEY: ICD-10-CM

## 2023-10-25 PROCEDURE — 99204 OFFICE O/P NEW MOD 45 MIN: CPT

## 2023-10-30 ENCOUNTER — EMERGENCY (EMERGENCY)
Facility: HOSPITAL | Age: 73
LOS: 0 days | Discharge: HOME | End: 2023-10-30
Attending: STUDENT IN AN ORGANIZED HEALTH CARE EDUCATION/TRAINING PROGRAM
Payer: MEDICARE

## 2023-10-30 VITALS
DIASTOLIC BLOOD PRESSURE: 77 MMHG | OXYGEN SATURATION: 95 % | SYSTOLIC BLOOD PRESSURE: 109 MMHG | HEART RATE: 80 BPM | RESPIRATION RATE: 16 BRPM

## 2023-10-30 VITALS
HEART RATE: 90 BPM | DIASTOLIC BLOOD PRESSURE: 67 MMHG | SYSTOLIC BLOOD PRESSURE: 121 MMHG | HEIGHT: 64 IN | TEMPERATURE: 98 F | RESPIRATION RATE: 18 BRPM | OXYGEN SATURATION: 98 % | WEIGHT: 126.99 LBS
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
## 2023-10-30 DIAGNOSIS — Z90.5 ACQUIRED ABSENCE OF KIDNEY: Chronic | ICD-10-CM

## 2023-10-30 DIAGNOSIS — M54.2 CERVICALGIA: ICD-10-CM

## 2023-10-30 DIAGNOSIS — Z88.6 ALLERGY STATUS TO ANALGESIC AGENT: ICD-10-CM

## 2023-10-30 DIAGNOSIS — Z90.79 ACQUIRED ABSENCE OF OTHER GENITAL ORGAN(S): Chronic | ICD-10-CM

## 2023-10-30 DIAGNOSIS — Z90.49 ACQUIRED ABSENCE OF OTHER SPECIFIED PARTS OF DIGESTIVE TRACT: Chronic | ICD-10-CM

## 2023-10-30 DIAGNOSIS — Z90.5 ACQUIRED ABSENCE OF KIDNEY: ICD-10-CM

## 2023-10-30 DIAGNOSIS — Z90.49 ACQUIRED ABSENCE OF OTHER SPECIFIED PARTS OF DIGESTIVE TRACT: ICD-10-CM

## 2023-10-30 DIAGNOSIS — Z87.891 PERSONAL HISTORY OF NICOTINE DEPENDENCE: ICD-10-CM

## 2023-10-30 DIAGNOSIS — Z85.46 PERSONAL HISTORY OF MALIGNANT NEOPLASM OF PROSTATE: ICD-10-CM

## 2023-10-30 DIAGNOSIS — J43.9 EMPHYSEMA, UNSPECIFIED: ICD-10-CM

## 2023-10-30 DIAGNOSIS — Z85.528 PERSONAL HISTORY OF OTHER MALIGNANT NEOPLASM OF KIDNEY: ICD-10-CM
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
## 2023-10-30 DIAGNOSIS — Z98.890 OTHER SPECIFIED POSTPROCEDURAL STATES: Chronic | ICD-10-CM

## 2023-10-30 PROCEDURE — 70450 CT HEAD/BRAIN W/O DYE: CPT | Mod: MA

## 2023-10-30 PROCEDURE — 72125 CT NECK SPINE W/O DYE: CPT | Mod: 26,MA

## 2023-10-30 PROCEDURE — 70450 CT HEAD/BRAIN W/O DYE: CPT | Mod: 26,MA

## 2023-10-30 PROCEDURE — 99284 EMERGENCY DEPT VISIT MOD MDM: CPT | Mod: 25

## 2023-10-30 PROCEDURE — 72125 CT NECK SPINE W/O DYE: CPT | Mod: MA

## 2023-10-30 PROCEDURE — 99284 EMERGENCY DEPT VISIT MOD MDM: CPT

## 2023-10-30 RX ORDER — METHOCARBAMOL 500 MG/1
2 TABLET, FILM COATED ORAL
Qty: 42 | Refills: 0
Start: 2023-10-30 | End: 2023-11-05

## 2023-10-30 RX ORDER — TIZANIDINE 4 MG/1
2 TABLET ORAL
Qty: 30 | Refills: 0
Start: 2023-10-30 | End: 2023-11-03

## 2023-10-30 NOTE — ED ADULT NURSE NOTE - HOW OFTEN DO YOU HAVE A DRINK CONTAINING ALCOHOL?
[de-identified] : right foot/ankle pain duration: 1 year pain get worse while working out, stiffness, walking barefoot causes pain.  No PT Ibuprofen some relief  No images      
Never

## 2023-10-30 NOTE — ED PROVIDER NOTE - ATTENDING APP SHARED VISIT CONTRIBUTION OF CARE
I personally evaluated the patient. I reviewed the Resident’s or Physician Assistant’s note (as assigned above), and agree with the findings and plan except as documented in my note.  73-year-old male history of COPD emphysema currently cutting down on tobacco history of a lung nodule which he has an appointment next month and history of brain aneurysm 1 which was coiled 1 which was not fell from his bed at 2 AM his neck hit the nightstand and he slid down no LOC not on anticoagulation initially felt dizzy after the injury but that resolved no blurry vision numbness tingling ambulatory in the ED  CONSTITUTIONAL: WA / WN / NAD  HEAD: NCAT  EYES: PERRL; EOMI;   ENT: Normal pharynx; mucous membranes pink/moist, no erythema.  NECK: Supple; +midline c5/c6 ttp  MSK/EXT: No gross deformities  SKIN: Warm and dry;   NEURO: AAOx3, Motor 5/5 x 4 extremities no drift no dysmeteria no facial droop normal speech   PSYCH: Memory Intact, Normal Affect

## 2023-10-30 NOTE — ED PROVIDER NOTE - PATIENT PORTAL LINK FT
You can access the FollowMyHealth Patient Portal offered by [facility] by registering at the following website: [email or web address]. By joining [employer]’s FollowMyHealth portal, you will also be able to view your health information using other applications (apps) compatible with our system.

## 2023-10-30 NOTE — ED ADULT TRIAGE NOTE - AS HEIGHT TYPE
Infusion Nursing Note:  Jennifer Cervantes presents today for add on IVF and pain meds.    Patient seen by provider today: No   present during visit today: Not Applicable.    Note: No labs/no provider visit; 1L D51/2NS given over 2 hours. 2mg IVP morphine given Q1HR for max 3 doses. Patient reported pain improvement 6/10 prior to discharge.    Intravenous Access:  Implanted Port.  +BR noted pre and post infusion  De-accessed prior to discharge.    Treatment Conditions:  Not Applicable.    Post Infusion Assessment:  Patient tolerated infusion without incident.     Discharge Plan:   Patient discharged in stable condition accompanied by: self.      Allison Wiggins RN                       stated

## 2023-10-30 NOTE — ED PROVIDER NOTE - OBJECTIVE STATEMENT
74 y/o male with hx of COPD, prostate cancer, former smoker, hiatal hernia presents to the Ed for evaluation of neck pain s/p fall . patient fell out of bed striking his neck on the nightstand before hitting the floor at 0200. no weakness to extremities no tingling to extremities. pt ambulatory . patient took ibuprofen at 0800 without any relief of symptoms. no vomiting, nausea, visual changes, tinnitus. no headaches. patient able to tolerate his secretions and swallow without difficulty

## 2023-10-30 NOTE — ED PROVIDER NOTE - CARE PLAN
Principal Discharge DX:	Fall  Secondary Diagnosis:	Neck pain  Secondary Diagnosis:	History of cerebral aneurysm   1

## 2023-10-30 NOTE — ED ADULT NURSE NOTE - NSFALLRISKINTERV_ED_ALL_ED

## 2023-10-30 NOTE — ED PROVIDER NOTE - PHYSICAL EXAMINATION
general: well appearing, comfortable  eyes: clear conjunctiva  abd: non tender, non distended, BS x 4, no CVA tenderness  msk: neck supple, +ttp cervical spine at c5-6 , no neck swelling or crepitation, good rom of upper extremities,  pelvis stable, neg SLR, no sciatic notch tenderness, no foot drop, no vertebral tenderness, flexion / extension lumbar region in tact  skin: no rashes, swelling  neuro: alert, oriented , no motor or sensory deficits , gait steady

## 2023-10-30 NOTE — ED PROVIDER NOTE - CLINICAL SUMMARY MEDICAL DECISION MAKING FREE TEXT BOX
73-year-old male history of COPD emphysema currently cutting down on tobacco history of a lung nodule which he has an appointment next month and history of brain aneurysm 1 which was coiled 1 which was not fell from his bed at 2 AM his neck hit the nightstand and he slid down no LOC not on anticoagulation initially felt dizzy after the injury but that resolved no blurry vision numbness tingling ambulatory in the ED vs reviewed imaging obtained and reviewed. Patient a spoken to in detail about results  All questions addressed.  Results of ED work up discussed and patient given a copy of the results. Patient has proper follow up. Return precautions given. recommended neurosx follow up

## 2023-10-30 NOTE — ED PROVIDER NOTE - NSFOLLOWUPINSTRUCTIONS_ED_ALL_ED_FT
Our Emergency Department Referral Coordinators will be reaching out to you in the next 24-48 hours from 9:00am to 5:00pm with a follow up appointment. Please expect a phone call from the hospital in that time frame. If you do not receive a call or if you have any questions or concerns, you can reach them at   [phone]        Cervical Sprain     A cervical sprain is a stretch or tear in one or more of the tough, cord-like tissues that connect bones (ligaments) in the neck. Cervical sprains can range from mild to severe. Severe cervical sprains can cause the spinal bones (vertebrae) in the neck to be unstable. This can lead to spinal cord damage and can result in serious nervous system problems.  The amount of time that it takes for a cervical sprain to get better depends on the cause and extent of the injury. Most cervical sprains heal in 4–6 weeks.  What are the causes?  Cervical sprains may be caused by an injury (trauma), such as from a motor vehicle accident, a fall, or sudden forward and backward whipping movement of the head and neck (whiplash injury).  Mild cervical sprains may be caused by wear and tear over time, such as from poor posture, sitting in a chair that does not provide support, or looking up or down for long periods of time.  What increases the risk?  The following factors may make you more likely to develop this condition:  Participating in activities that have a high risk of trauma to the neck. These include contact sports, auto racing, gymnastics, and diving.Taking risks when driving or riding in a motor vehicle, such as speeding.Having osteoarthritis of the spine.Having poor strength and flexibility of the neck.A previous neck injury.Having poor posture.Spending a lot of time in certain positions that put stress on the neck, such as sitting at a computer for long periods of time.What are the signs or symptoms?  Symptoms of this condition include:  Pain, soreness, stiffness, tenderness, swelling, or a burning sensation in the front, back, or sides of the neck.Sudden tightening of neck muscles that you cannot control (muscle spasms).Pain in the shoulders or upper back.Limited ability to move the neck.Headache.Dizziness.Nausea.Vomiting.Weakness, numbness, or tingling in a hand or an arm.Symptoms may develop right away after injury, or they may develop over a few days. In some cases, symptoms may go away with treatment and return (recur) over time.  How is this diagnosed?  This condition may be diagnosed based on:  Your medical history.Your symptoms.Any recent injuries or known neck problems that you have, such as arthritis in the neck.A physical exam.Imaging tests, such as:  X-rays.MRI.CT scan.How is this treated?  This condition is treated by resting and icing the injured area and doing physical therapy exercises. Depending on the severity of your condition, treatment may also include:  Keeping your neck in place (immobilized) for periods of time. This may be done using:  A cervical collar. This supports your chin and the back of your head.A cervical traction device. This is a sling that holds up your head. This removes weight and pressure from your neck, and it may help to relieve pain.Medicines that help to relieve pain and inflammation.Medicines that help to relax your muscles (muscle relaxants).Surgery. This is rare.Follow these instructions at home:  If you have a cervical collar:        Wear it as told by your health care provider. Do not remove the collar unless instructed by your health care provider.Ask your health care provider before you make any adjustments to your collar.If you have long hair, keep it outside of the collar.Ask your health care provider if you can remove the collar for cleaning and bathing. If you are allowed to remove the collar for cleaning or bathing:  Follow instructions from your health care provider about how to remove the collar safely.Clean the collar by wiping it with mild soap and water and drying it completely.If your collar has removable pads, remove them every 1–2 days and wash them by hand with soap and water. Let them air-dry completely before you put them back in the collar.Check your skin under the collar for irritation or sores. If you see any, tell your health care provider.Managing pain, stiffness, and swelling        If directed, use a cervical traction device as told by your health care provider.If directed, apply heat to the affected area before you do your physical therapy or as often as told by your health care provider. Use the heat source that your health care provider recommends, such as a moist heat pack or a heating pad.  Place a towel between your skin and the heat source.Leave the heat on for 20–30 minutes.Remove the heat if your skin turns bright red. This is especially important if you are unable to feel pain, heat, or cold. You may have a greater risk of getting burned.If directed, put ice on the affected area:  Put ice in a plastic bag.Place a towel between your skin and the bag.Leave the ice on for 20 minutes, 2–3 times a day.Activity     Do not drive while wearing a cervical collar. If you do not have a cervical collar, ask your health care provider if it is safe to drive while your neck heals.Do not drive or use heavy machinery while taking prescription pain medicine or muscle relaxants, unless your health care provider approves.Do not lift anything that is heavier than 10 lb (4.5 kg) until your health care provider tells you that it is safe.Rest as directed by your health care provider. Avoid positions and activities that make your symptoms worse. Ask your health care provider what activities are safe for you.If physical therapy was prescribed, do exercises as told by your health care provider or physical therapist.General instructions     Take over-the-counter and prescription medicines only as told by your health care provider.Do not use any products that contain nicotine or tobacco, such as cigarettes and e-cigarettes. These can delay healing. If you need help quitting, ask your health care provider.Keep all follow-up visits as told by your health care provider or physical therapist. This is important.How is this prevented?  To prevent a cervical sprain from happening again:  Use and maintain good posture. Make any needed adjustments to your workstation to help you use good posture.Exercise regularly as directed by your health care provider or physical therapist.Avoid risky activities that may cause a cervical sprain.Contact a health care provider if:  You have symptoms that get worse or do not get better after 2 weeks of treatment.You have pain that gets worse or does not get better with medicine.You develop new, unexplained symptoms.You have sores or irritated skin on your neck from wearing your cervical collar.Get help right away if:  You have severe pain.You develop numbness, tingling, or weakness in any part of your body.You cannot move a part of your body (you have paralysis).You have neck pain along with:  Severe dizziness.Headache.Summary  A cervical sprain is a stretch or tear in one or more of the tough, cord-like tissues that connect bones (ligaments) in the neck.Cervical sprains may be caused by an injury (trauma), such as from a motor vehicle accident, a fall, or sudden forward and backward whipping movement of the head and neck (whiplash injury).Symptoms may develop right away after injury, or they may develop over a few days.This condition is treated by resting and icing the injured area and doing physical therapy exercises.This information is not intended to replace advice given to you by your health care provider. Make sure you discuss any questions you have with your health care provider.    Document Released: 10/14/2008 Document Revised: 04/08/2020 Document Reviewed: 08/16/2017  Elsevier Patient Education © 2020 [employer] Inc[employer]

## 2023-11-07 ENCOUNTER — OUTPATIENT (OUTPATIENT)
Dept: OUTPATIENT SERVICES | Facility: HOSPITAL | Age: 73
LOS: 1 days | End: 2023-11-07
Payer: MEDICARE

## 2023-11-07 ENCOUNTER — RESULT REVIEW (OUTPATIENT)
Age: 73
End: 2023-11-07

## 2023-11-07 DIAGNOSIS — Z90.79 ACQUIRED ABSENCE OF OTHER GENITAL ORGAN(S): Chronic | ICD-10-CM

## 2023-11-07 DIAGNOSIS — Z90.5 ACQUIRED ABSENCE OF KIDNEY: Chronic | ICD-10-CM

## 2023-11-07 DIAGNOSIS — Z00.8 ENCOUNTER FOR OTHER GENERAL EXAMINATION: ICD-10-CM

## 2023-11-07 DIAGNOSIS — Z90.49 ACQUIRED ABSENCE OF OTHER SPECIFIED PARTS OF DIGESTIVE TRACT: Chronic | ICD-10-CM

## 2023-11-07 DIAGNOSIS — Z98.890 OTHER SPECIFIED POSTPROCEDURAL STATES: Chronic | ICD-10-CM

## 2023-11-07 DIAGNOSIS — R91.1 SOLITARY PULMONARY NODULE: ICD-10-CM

## 2023-11-07 DIAGNOSIS — R06.2 WHEEZING: ICD-10-CM

## 2023-11-07 PROCEDURE — 94664 DEMO&/EVAL PT USE INHALER: CPT

## 2023-11-07 PROCEDURE — 94727 GAS DIL/WSHOT DETER LNG VOL: CPT | Mod: 26

## 2023-11-07 PROCEDURE — 71250 CT THORAX DX C-: CPT | Mod: 26

## 2023-11-07 PROCEDURE — 94729 DIFFUSING CAPACITY: CPT

## 2023-11-07 PROCEDURE — 71250 CT THORAX DX C-: CPT

## 2023-11-07 PROCEDURE — 94727 GAS DIL/WSHOT DETER LNG VOL: CPT

## 2023-11-07 PROCEDURE — 94070 EVALUATION OF WHEEZING: CPT

## 2023-11-07 PROCEDURE — 94060 EVALUATION OF WHEEZING: CPT | Mod: 26

## 2023-11-07 PROCEDURE — 94729 DIFFUSING CAPACITY: CPT | Mod: 26

## 2023-11-08 DIAGNOSIS — R06.2 WHEEZING: ICD-10-CM

## 2023-11-08 DIAGNOSIS — R91.1 SOLITARY PULMONARY NODULE: ICD-10-CM

## 2023-12-06 ENCOUNTER — APPOINTMENT (OUTPATIENT)
Dept: UROLOGY | Facility: CLINIC | Age: 73
End: 2023-12-06
Payer: MEDICARE

## 2023-12-06 DIAGNOSIS — N52.9 MALE ERECTILE DYSFUNCTION, UNSPECIFIED: ICD-10-CM

## 2023-12-06 PROCEDURE — 99213 OFFICE O/P EST LOW 20 MIN: CPT

## 2023-12-06 RX ORDER — FINASTERIDE 5 MG/1
5 TABLET, FILM COATED ORAL DAILY
Qty: 90 | Refills: 3 | Status: DISCONTINUED | COMMUNITY
Start: 2017-11-10 | End: 2023-12-06

## 2023-12-06 RX ORDER — TADALAFIL 20 MG/1
20 TABLET ORAL
Qty: 90 | Refills: 3 | Status: ACTIVE | COMMUNITY
Start: 2023-12-06 | End: 1900-01-01

## 2023-12-10 PROBLEM — N52.9 ORGANIC IMPOTENCE: Status: ACTIVE | Noted: 2020-02-27

## 2023-12-13 ENCOUNTER — APPOINTMENT (OUTPATIENT)
Dept: PULMONOLOGY | Facility: CLINIC | Age: 73
End: 2023-12-13

## 2024-02-16 ENCOUNTER — APPOINTMENT (OUTPATIENT)
Dept: UROLOGY | Facility: CLINIC | Age: 74
End: 2024-02-16

## 2024-06-10 ENCOUNTER — APPOINTMENT (OUTPATIENT)
Dept: UROLOGY | Facility: CLINIC | Age: 74
End: 2024-06-10
Payer: MEDICARE

## 2024-06-10 ENCOUNTER — LABORATORY RESULT (OUTPATIENT)
Age: 74
End: 2024-06-10

## 2024-06-10 VITALS
BODY MASS INDEX: 22.5 KG/M2 | DIASTOLIC BLOOD PRESSURE: 71 MMHG | HEIGHT: 63 IN | SYSTOLIC BLOOD PRESSURE: 126 MMHG | TEMPERATURE: 97.5 F | HEART RATE: 87 BPM | WEIGHT: 127 LBS

## 2024-06-10 DIAGNOSIS — R97.20 ELEVATED PROSTATE, SPECIFIC ANTIGEN [PSA]: ICD-10-CM

## 2024-06-10 DIAGNOSIS — N40.1 BENIGN PROSTATIC HYPERPLASIA WITH LOWER URINARY TRACT SYMPMS: ICD-10-CM

## 2024-06-10 DIAGNOSIS — N13.8 BENIGN PROSTATIC HYPERPLASIA WITH LOWER URINARY TRACT SYMPMS: ICD-10-CM

## 2024-06-10 DIAGNOSIS — R33.9 RETENTION OF URINE, UNSPECIFIED: ICD-10-CM

## 2024-06-10 PROCEDURE — 99406 BEHAV CHNG SMOKING 3-10 MIN: CPT

## 2024-06-10 PROCEDURE — 99214 OFFICE O/P EST MOD 30 MIN: CPT | Mod: 25

## 2024-06-10 PROCEDURE — 51798 US URINE CAPACITY MEASURE: CPT

## 2024-06-11 LAB
PSA FREE FLD-MCNC: 28 %
PSA FREE SERPL-MCNC: 0.89 NG/ML
PSA SERPL-MCNC: 3.17 NG/ML

## 2024-06-13 PROBLEM — R97.20 ELEVATED PROSTATE SPECIFIC ANTIGEN (PSA): Status: ACTIVE | Noted: 2020-02-27

## 2024-06-13 PROBLEM — R33.9 URINARY RETENTION: Status: ACTIVE | Noted: 2017-09-21

## 2024-06-13 PROBLEM — N40.1 BENIGN LOCALIZED HYPERPLASIA OF PROSTATE WITH URINARY OBSTRUCTION: Status: ACTIVE | Noted: 2018-02-23

## 2024-06-13 NOTE — ASSESSMENT
[FreeTextEntry1] : 73 year old male who is followed for history of elevated PSA, BPH, and incomplete bladder emptying. offers no other complaints   He remains on Finasteride, Tamsulosin, and Bethanechol with good efficacy without adverse events.  PSA 06/10/2024 - 3.17 ng/ml / 28 % free - off finasteride PSA 10/10/2023 - 1.85 ng/ml with free 22% PSA 03/04/2022- 1.62 ng/mL with free 26%.    ml  US Kidney and Bladder-10/2023 FINDINGS: Right kidney: 11.4 cm. No hydronephrosis or calculi. Right parapelvic cyst measuring up to 2.9 cm. Left kidney: Status post left nephrectomy. Urinary bladder: No debris or calculus.  Right ureteral jet is seen.  Left ureteral jet is not seen, which is a nonspecific finding.  Prevoid volume of approximately 245 cc.   Postvoid volume is approximately 40 cc. Prostate: 3.3 x 2.9 x 3.9 cm, total volume 19.8 cc.  IMPRESSION:  Patient is again noted to be status post left nephrectomy. Post void residual volume is 40 cc.  Good effect with Cialis for erections.   since he stopped all other medication he is able to ejaculate - but I pointed out his large PVR and recommended he re-start flomax and keep cialis PRN for sex  Plan urinary retention and ED  Good effect with Cialis for erections.   - continue Cialis - continue Bethanechol - PSA reviewed - stable (accepted elevation due to stopping finasteride) - restart flomax - renal and bladder US to re-assess (suspect primary bladder neck dysfunction given the large PVR and regaining ejaculation with stopping flomax

## 2024-06-13 NOTE — PHYSICAL EXAM
[General Appearance - In No Acute Distress] : no acute distress [Testes Mass (___cm)] : there were no testicular masses [No Prostate Nodules] : no prostate nodules [] : no respiratory distress [Oriented To Time, Place, And Person] : oriented to person, place, and time [Normal Station and Gait] : the gait and station were normal for the patient's age

## 2024-06-13 NOTE — HISTORY OF PRESENT ILLNESS
[FreeTextEntry1] : 73 year old male who is followed for history of elevated PSA, BPH, and incomplete bladder emptying. offers no other complaints   He remains on Finasteride, Tamsulosin, and Bethanechol with good efficacy without adverse events.  PSA 06/10/2024 - 3.17 ng/ml / 28 % free - off finasteride PSA 10/10/2023 - 1.85 ng/ml with free 22% PSA 03/04/2022- 1.62 ng/mL with free 26%.    ml  US Kidney and Bladder-10/2023 FINDINGS: Right kidney: 11.4 cm. No hydronephrosis or calculi. Right parapelvic cyst measuring up to 2.9 cm. Left kidney: Status post left nephrectomy. Urinary bladder: No debris or calculus.  Right ureteral jet is seen.  Left ureteral jet is not seen, which is a nonspecific finding.  Prevoid volume of approximately 245 cc.   Postvoid volume is approximately 40 cc. Prostate: 3.3 x 2.9 x 3.9 cm, total volume 19.8 cc.  IMPRESSION:  Patient is again noted to be status post left nephrectomy. Post void residual volume is 40 cc.  Good effect with Cialis for erections.  since he stopped all other medication he is able to ejaculate - but I pointed out his large PVR and recommended he re-start flomax and keep cialis PRN for sex   [None] : None

## 2024-08-24 ENCOUNTER — RESULT REVIEW (OUTPATIENT)
Age: 74
End: 2024-08-24

## 2024-08-24 ENCOUNTER — OUTPATIENT (OUTPATIENT)
Dept: OUTPATIENT SERVICES | Facility: HOSPITAL | Age: 74
LOS: 1 days | End: 2024-08-24
Payer: MEDICAID

## 2024-08-24 DIAGNOSIS — Z00.8 ENCOUNTER FOR OTHER GENERAL EXAMINATION: ICD-10-CM

## 2024-08-24 DIAGNOSIS — Z90.79 ACQUIRED ABSENCE OF OTHER GENITAL ORGAN(S): Chronic | ICD-10-CM

## 2024-08-24 DIAGNOSIS — Z98.890 OTHER SPECIFIED POSTPROCEDURAL STATES: Chronic | ICD-10-CM

## 2024-08-24 DIAGNOSIS — N40.1 BENIGN PROSTATIC HYPERPLASIA WITH LOWER URINARY TRACT SYMPTOMS: ICD-10-CM

## 2024-08-24 DIAGNOSIS — R97.20 ELEVATED PROSTATE SPECIFIC ANTIGEN [PSA]: ICD-10-CM

## 2024-08-24 DIAGNOSIS — Z90.49 ACQUIRED ABSENCE OF OTHER SPECIFIED PARTS OF DIGESTIVE TRACT: Chronic | ICD-10-CM

## 2024-08-24 DIAGNOSIS — Z90.5 ACQUIRED ABSENCE OF KIDNEY: Chronic | ICD-10-CM

## 2024-08-24 PROCEDURE — 76770 US EXAM ABDO BACK WALL COMP: CPT

## 2024-08-24 PROCEDURE — 76770 US EXAM ABDO BACK WALL COMP: CPT | Mod: 26

## 2024-08-25 DIAGNOSIS — R97.20 ELEVATED PROSTATE SPECIFIC ANTIGEN [PSA]: ICD-10-CM

## 2024-08-25 DIAGNOSIS — N40.1 BENIGN PROSTATIC HYPERPLASIA WITH LOWER URINARY TRACT SYMPTOMS: ICD-10-CM

## 2024-08-28 ENCOUNTER — NON-APPOINTMENT (OUTPATIENT)
Age: 74
End: 2024-08-28

## 2024-10-10 NOTE — ED ADULT NURSE NOTE - PAIN: PRESENCE, MLM
SURGERY SCHEDULING SHEET    Catina Rivera  3/12/1971  [id number]    Procedure: Right total knee arthroplasty    CPT: 89812    Diagnosis: Right knee osteoarthritis    Anesthesia: Spinal    Antibiotics: IV Ancef    TXA: IV     Anticoagulation: currently on Eliquis    Length of Surgery: 3 hours    Disposition: Outpatient in a Bed    Instruments: Byron TKA - Triathlon with TS insert available    Assist: Ask for Joaquin Vásquez ([phone]) first assist. If Joaquin unavailable, then please contact Dameon Cancino for first assist. If Joaquin and Dameon unavailable, then contact [facility] for first assist.    Pre-op Testing: CBC, CMP, PT/INR, PTT, TYPE AND SCREEN, MRSA/MSSA THROUGH EDW PAT, and HEMOGLOBIN A1C THROUGH EDW PAT    Clearance: MEDICAL/PCP and CARDIAC    Post op: 2 weeks with Dr Connelly    Surgery date specifics: Mid to late January earliest given recent injection on 9/28/24    Ruslan Connelly MD  Adult Reconstruction    Department of Orthopaedic Surgery  [facility]     [address] [place] [id number]  [address] [place] [address]     t: [phone]  f: [phone]       [facility]               complains of pain/discomfort

## 2024-11-13 ENCOUNTER — EMERGENCY (EMERGENCY)
Facility: HOSPITAL | Age: 74
LOS: 0 days | Discharge: HOME | End: 2024-11-13
Attending: STUDENT IN AN ORGANIZED HEALTH CARE EDUCATION/TRAINING PROGRAM
Payer: MEDICARE

## 2024-11-13 VITALS
HEART RATE: 88 BPM | OXYGEN SATURATION: 99 % | DIASTOLIC BLOOD PRESSURE: 69 MMHG | WEIGHT: 130.07 LBS | RESPIRATION RATE: 18 BRPM | SYSTOLIC BLOOD PRESSURE: 105 MMHG | TEMPERATURE: 98 F

## 2024-11-13 DIAGNOSIS — J44.9 CHRONIC OBSTRUCTIVE PULMONARY DISEASE, UNSPECIFIED: ICD-10-CM

## 2024-11-13 DIAGNOSIS — Z90.79 ACQUIRED ABSENCE OF OTHER GENITAL ORGAN(S): Chronic | ICD-10-CM

## 2024-11-13 DIAGNOSIS — Z90.5 ACQUIRED ABSENCE OF KIDNEY: ICD-10-CM

## 2024-11-13 DIAGNOSIS — Z88.6 ALLERGY STATUS TO ANALGESIC AGENT: ICD-10-CM

## 2024-11-13 DIAGNOSIS — N40.0 BENIGN PROSTATIC HYPERPLASIA WITHOUT LOWER URINARY TRACT SYMPTOMS: ICD-10-CM

## 2024-11-13 DIAGNOSIS — E78.5 HYPERLIPIDEMIA, UNSPECIFIED: ICD-10-CM

## 2024-11-13 DIAGNOSIS — Z85.528 PERSONAL HISTORY OF OTHER MALIGNANT NEOPLASM OF KIDNEY: ICD-10-CM

## 2024-11-13 DIAGNOSIS — Z90.5 ACQUIRED ABSENCE OF KIDNEY: Chronic | ICD-10-CM

## 2024-11-13 DIAGNOSIS — Z88.5 ALLERGY STATUS TO NARCOTIC AGENT: ICD-10-CM

## 2024-11-13 DIAGNOSIS — Z98.890 OTHER SPECIFIED POSTPROCEDURAL STATES: Chronic | ICD-10-CM

## 2024-11-13 DIAGNOSIS — M25.511 PAIN IN RIGHT SHOULDER: ICD-10-CM

## 2024-11-13 DIAGNOSIS — Z86.79 PERSONAL HISTORY OF OTHER DISEASES OF THE CIRCULATORY SYSTEM: ICD-10-CM

## 2024-11-13 DIAGNOSIS — Z87.891 PERSONAL HISTORY OF NICOTINE DEPENDENCE: ICD-10-CM

## 2024-11-13 DIAGNOSIS — Z90.49 ACQUIRED ABSENCE OF OTHER SPECIFIED PARTS OF DIGESTIVE TRACT: Chronic | ICD-10-CM

## 2024-11-13 PROCEDURE — 99284 EMERGENCY DEPT VISIT MOD MDM: CPT | Mod: FS

## 2024-11-13 PROCEDURE — 73030 X-RAY EXAM OF SHOULDER: CPT | Mod: 26,RT

## 2024-11-13 PROCEDURE — 99283 EMERGENCY DEPT VISIT LOW MDM: CPT | Mod: 25

## 2024-11-13 PROCEDURE — 73030 X-RAY EXAM OF SHOULDER: CPT | Mod: RT

## 2024-11-13 NOTE — ED PROVIDER NOTE - ATTENDING APP SHARED VISIT CONTRIBUTION OF CARE
75 yo M with hx of brain aneurysm, kidney cancer s/p L nephrectomy, COPD, HLD, BPH who presents with atraumatic R shoulder burning pain which started this AM. Pain worse with moving R shoulder. No fever, weakness, numbness, swelling. R hand dominant and lifts heavy things at work but denies inciting event. Says he has had similar sx in past. Took motrin and tylenol prior to arrival. No hx of DM.    PMD Dr. Piper    CONSTITUTIONAL: well developed, nontoxic appearing, in no acute distress, speaking in full sentences  SKIN: warm, dry, no rash, cap refill < 2 seconds  HEENT: normocephalic, atraumatic, no conjunctival erythema, moist mucous membranes, patent airway  NECK: supple  CV:  regular rate, regular rhythm, 2+ radial pulses bilaterally  RESP: no wheezes, no rales, no rhonchi, normal work of breathing  ABD: soft, no tenderness, nondistended, no rebound, no guarding  MSK: point tenderness to R anterior upper humerus at insertion of bicep tendon, normal wrist flexion/extension, normal  strength, normal pronation/supination of forearm, no tenderness to wrist/forearm, no tenderness to elbow/humerus, normal shoulder abduction/adduction, able to touch contralateral shoulder, 2+ radial pulses, sensation intact to touch, cap refill <2 seconds, no erythema, no increased warmth to touch, no tenderness to lateral shoulder/deltoid, normal ROM, no cyanosis, no edema  NEURO: alert, oriented, grossly unremarkable  PSYCH: cooperative, appropriate    A&P:  Pt here with R shoulder pain suggestive of bicep tendinitis. Neurovascularly intact. Full ROM. Less likely frozen shoulder. Discussed with pt need for possible MRI for further work up for their sx and how this was not possible in the ED at this time. Pt wants xray. Plan for imaging r/o fx, dislocation, contusion, effusion.

## 2024-11-13 NOTE — ED PROVIDER NOTE - PATIENT PORTAL LINK FT
You can access the FollowMyHealth Patient Portal offered by [facility] by registering at the following website: [email or web address]. By joining [employer]’s FollowMyHealth portal, you will also be able to view your health information using other applications (apps) compatible with our system.

## 2024-11-13 NOTE — ED PROVIDER NOTE - NSFOLLOWUPINSTRUCTIONS_ED_ALL_ED_FT
Our Emergency Department Referral Coordinators will be reaching out to you in the next 24-48 hours from 9:00am to 5:00pm to schedule a follow up appointment. Please expect a phone call from the hospital in that time frame. If you do not receive a call or if you have any questions or concerns, you can reach them at [phone].  ---  Rotator Cuff Tendinitis    Rotator cuff tendinitis is inflammation of the tendons in the rotator cuff. Tendons are tough, cord-like bands that connect muscle to bone. The rotator cuff includes all of the muscles and tendons that connect the arm to the shoulder. The rotator cuff holds the head of the humerus, or the upper arm bone, in the cup of the shoulder blade (scapula).    This condition can lead to a long-term (chronic) tear. The tear may be partial or complete.    What are the causes?  This condition is usually caused by overusing the rotator cuff.    What increases the risk?  This condition is more likely to develop in athletes and workers who frequently use their shoulder or reach over their heads. This can include activities such as:  - Tennis.  - Baseball or softball.  - Swimming.  - Construction work.  - Painting.    What are the signs or symptoms?  Symptoms of this condition include:  - Pain that spreads (radiates) from the shoulder to the upper arm.  - Swelling and tenderness in front of the shoulder.  - Pain when reaching, pulling, or lifting the arm above the head.  - Pain when lowering the arm from above the head.  - Minor pain in the shoulder when resting.  - Increased pain in the shoulder at night.  - Difficulty placing the arm behind the back.    How is this diagnosed?  This condition is diagnosed with a physical exam and medical history. Tests may also be done, including:  - X-rays.  - CT.  - MRI.  - Ultrasound.    How is this treated?  Treatment depends on the severity of the condition. In less severe cases, treatment may include:  - Rest. This may be done with a sling that holds the shoulder still (immobilization). Your health care provider may also recommend avoiding activities that involve lifting your arm over your head.  - Icing the shoulder.  - Anti-inflammatory medicines, such as aspirin or ibuprofen.  In more severe cases, treatment may include:  - Physical therapy.  - Steroid injections.  - Surgery.    Follow these instructions at home:  If you have a removable sling:  Wear the sling as told by your provider. Remove it only as told by your provider.  Check the skin around the sling every day. Tell your provider about any concerns.  Loosen the sling if your fingers tingle, become numb, or turn cold or blue.  Keep the sling clean and dry.    If the sling is not waterproof:  Do not let it get wet.  Remove it as told by your provider when you take a bath or shower.    Managing pain, stiffness, and swelling    Bag of ice on a towel on the skin.  If told, put ice on the injured area.  If you have a removable sling, remove it as told by your provider.  Put ice in a plastic bag.  Place a towel between your skin and the bag.  Leave the ice on for 20 minutes, 2–3 times a day.  If your skin turns bright red, remove the ice right away to prevent skin damage. The risk of damage is higher if you cannot feel pain, heat, or cold.  Move your fingers often to reduce stiffness and swelling.  Raise (elevate) the injured area above the level of your heart while you are sitting or lying down.  Find a comfortable sleeping position, or sleep in a recliner, if available.    Activity    Rest your shoulder as told by your provider.  Ask your provider when it is safe to drive if you have a sling on your arm.  Return to your normal activities as told by your provider. Ask your provider what activities are safe for you.  Do any exercises or stretches as told by your provider or physical therapist.  If you do repetitive overhead tasks, take small breaks in between and include stretching exercises as told by your provider.    General instructions    Do not use any products that contain nicotine or tobacco. These products include cigarettes, chewing tobacco, and vaping devices, such as e-cigarettes. These can delay healing. If you need help quitting, ask your provider.  Take over-the-counter and prescription medicines only as told by your provider.  Contact a health care provider if:  - Your pain gets worse.  - You have new pain in your arm, hands, or fingers.  - Your pain is not relieved with medicine or does not get better after 6 weeks of treatment.  - You have crackling sensations when moving your shoulder in certain directions.  - You hear a snapping sound after using your shoulder, followed by severe pain and weakness.  - Your arm, hand, or fingers are numb or tingling.    Get help right away if:  - Your arm, hand, or fingers are swollen, painful, or they turn white or blue.    This information is not intended to replace advice given to you by your health care provider. Make sure you discuss any questions you have with your health care provider.

## 2024-11-13 NOTE — ED PROVIDER NOTE - CLINICAL SUMMARY MEDICAL DECISION MAKING FREE TEXT BOX
Pt here with R shoulder pain suggestive of bicep tendinitis. Neurovascularly intact. Full ROM. Less likely frozen shoulder. Discussed with pt need for possible MRI for further work up for their sx and how this was not possible in the ED at this time. Pt wants xray. Xray shows severe arthritic changes, no e/o fx, dislocation, effusion. Given ortho f/u for possible MRI. Strict ED return precautions given. Pt verbalized understanding and was agreeable with plan.

## 2024-11-13 NOTE — ED PROVIDER NOTE - OBJECTIVE STATEMENT
73 y/o male presents to the ED with right shoulder pain worse with movement since last night. patient preforms heavy lifting as a trade. patient denies any distal tingling. no weakness to upper extremity. no rashes. no neck or back pain . patient took tylenol this am with minimal relief of symptoms.

## 2024-11-13 NOTE — ED ADULT TRIAGE NOTE - INTERNATIONAL TRAVEL
pts wallet & glasses are with security; medications were given to pharmacy; pt waiting to speak with telepsych. No

## 2024-11-13 NOTE — ED PROVIDER NOTE - DIFFERENTIAL DIAGNOSIS
Differential Diagnosis differential dx includes but is not limited to:  bicep tendinitis. Less likely frozen shoulder, fx, dislocation, contusion, effusion

## 2024-12-05 ENCOUNTER — APPOINTMENT (OUTPATIENT)
Dept: ORTHOPEDIC SURGERY | Facility: CLINIC | Age: 74
End: 2024-12-05

## 2025-01-02 ENCOUNTER — APPOINTMENT (OUTPATIENT)
Dept: NEUROLOGY | Facility: CLINIC | Age: 75
End: 2025-01-02
Payer: MEDICARE

## 2025-01-02 VITALS — WEIGHT: 134 LBS | HEIGHT: 65 IN | BODY MASS INDEX: 22.33 KG/M2

## 2025-01-02 VITALS — SYSTOLIC BLOOD PRESSURE: 103 MMHG | HEART RATE: 73 BPM | DIASTOLIC BLOOD PRESSURE: 63 MMHG

## 2025-01-02 PROCEDURE — 99203 OFFICE O/P NEW LOW 30 MIN: CPT

## 2025-01-16 ENCOUNTER — APPOINTMENT (OUTPATIENT)
Dept: UROLOGY | Facility: CLINIC | Age: 75
End: 2025-01-16
Payer: MEDICARE

## 2025-01-16 DIAGNOSIS — N40.1 BENIGN PROSTATIC HYPERPLASIA WITH LOWER URINARY TRACT SYMPMS: ICD-10-CM

## 2025-01-16 DIAGNOSIS — N13.8 BENIGN PROSTATIC HYPERPLASIA WITH LOWER URINARY TRACT SYMPMS: ICD-10-CM

## 2025-01-16 LAB
BILIRUB UR QL STRIP: NORMAL
COLLECTION METHOD: NORMAL
GLUCOSE UR-MCNC: NORMAL
HCG UR QL: 0.2 EU/DL
HGB UR QL STRIP.AUTO: NORMAL
KETONES UR-MCNC: NORMAL
LEUKOCYTE ESTERASE UR QL STRIP: NORMAL
NITRITE UR QL STRIP: POSITIVE
PH UR STRIP: 6
PROT UR STRIP-MCNC: NORMAL
SP GR UR STRIP: 1.02

## 2025-01-16 PROCEDURE — 99214 OFFICE O/P EST MOD 30 MIN: CPT

## 2025-01-16 PROCEDURE — 81003 URINALYSIS AUTO W/O SCOPE: CPT | Mod: QW

## 2025-01-16 RX ORDER — FINASTERIDE 5 MG/1
5 TABLET, FILM COATED ORAL
Qty: 90 | Refills: 3 | Status: ACTIVE | COMMUNITY
Start: 2025-01-16 | End: 1900-01-01

## 2025-01-28 ENCOUNTER — LABORATORY RESULT (OUTPATIENT)
Age: 75
End: 2025-01-28

## 2025-02-13 ENCOUNTER — OUTPATIENT (OUTPATIENT)
Dept: OUTPATIENT SERVICES | Facility: HOSPITAL | Age: 75
LOS: 1 days | End: 2025-02-13
Payer: MEDICARE

## 2025-02-13 ENCOUNTER — RESULT REVIEW (OUTPATIENT)
Age: 75
End: 2025-02-13

## 2025-02-13 DIAGNOSIS — Z90.49 ACQUIRED ABSENCE OF OTHER SPECIFIED PARTS OF DIGESTIVE TRACT: Chronic | ICD-10-CM

## 2025-02-13 DIAGNOSIS — Z90.79 ACQUIRED ABSENCE OF OTHER GENITAL ORGAN(S): Chronic | ICD-10-CM

## 2025-02-13 DIAGNOSIS — Z98.890 OTHER SPECIFIED POSTPROCEDURAL STATES: Chronic | ICD-10-CM

## 2025-02-13 DIAGNOSIS — I67.1 CEREBRAL ANEURYSM, NONRUPTURED: ICD-10-CM

## 2025-02-13 DIAGNOSIS — Z90.5 ACQUIRED ABSENCE OF KIDNEY: Chronic | ICD-10-CM

## 2025-02-13 PROCEDURE — 70496 CT ANGIOGRAPHY HEAD: CPT | Mod: 26

## 2025-02-13 PROCEDURE — 70496 CT ANGIOGRAPHY HEAD: CPT

## 2025-02-14 DIAGNOSIS — I67.1 CEREBRAL ANEURYSM, NONRUPTURED: ICD-10-CM

## 2025-02-24 NOTE — ED ADULT NURSE NOTE - CAS TRG GENERAL AIRWAY, MLM
----- Message from Sidney Liu sent at 2/24/2025  1:43 PM CST -----  Regarding: New Admission  Scheduling Request    Patient Name: Chacho Lucas  Location of programming: [facility]  Start Date: February / 26 / 2025  Group: C on Wednesday at 11:00 AM  Attending Provider (MD): David  Duration of Appointment in minutes: 840  Visit Type: In-person or Treatment - 870  
Patent

## 2025-03-03 ENCOUNTER — OUTPATIENT (OUTPATIENT)
Dept: OUTPATIENT SERVICES | Facility: HOSPITAL | Age: 75
LOS: 1 days | End: 2025-03-03
Payer: MEDICARE

## 2025-03-03 DIAGNOSIS — Z90.49 ACQUIRED ABSENCE OF OTHER SPECIFIED PARTS OF DIGESTIVE TRACT: Chronic | ICD-10-CM

## 2025-03-03 DIAGNOSIS — Z90.5 ACQUIRED ABSENCE OF KIDNEY: Chronic | ICD-10-CM

## 2025-03-03 DIAGNOSIS — Z98.890 OTHER SPECIFIED POSTPROCEDURAL STATES: Chronic | ICD-10-CM

## 2025-03-03 DIAGNOSIS — Z90.79 ACQUIRED ABSENCE OF OTHER GENITAL ORGAN(S): Chronic | ICD-10-CM

## 2025-03-03 DIAGNOSIS — M25.511 PAIN IN RIGHT SHOULDER: ICD-10-CM

## 2025-03-03 PROCEDURE — 73200 CT UPPER EXTREMITY W/O DYE: CPT | Mod: 26,RT

## 2025-03-03 PROCEDURE — 73200 CT UPPER EXTREMITY W/O DYE: CPT | Mod: RT

## 2025-03-04 DIAGNOSIS — M25.511 PAIN IN RIGHT SHOULDER: ICD-10-CM

## 2025-03-31 ENCOUNTER — APPOINTMENT (OUTPATIENT)
Dept: NEUROLOGY | Facility: CLINIC | Age: 75
End: 2025-03-31
Payer: MEDICARE

## 2025-03-31 DIAGNOSIS — I67.1 CEREBRAL ANEURYSM, NONRUPTURED: ICD-10-CM

## 2025-03-31 PROCEDURE — 99214 OFFICE O/P EST MOD 30 MIN: CPT

## 2025-04-21 ENCOUNTER — NON-APPOINTMENT (OUTPATIENT)
Age: 75
End: 2025-04-21

## 2025-04-23 ENCOUNTER — APPOINTMENT (OUTPATIENT)
Dept: NEUROSURGERY | Facility: CLINIC | Age: 75
End: 2025-04-23
Payer: MEDICARE

## 2025-04-23 ENCOUNTER — OUTPATIENT (OUTPATIENT)
Dept: OUTPATIENT SERVICES | Facility: HOSPITAL | Age: 75
LOS: 1 days | End: 2025-04-23
Payer: MEDICARE

## 2025-04-23 VITALS
HEART RATE: 80 BPM | SYSTOLIC BLOOD PRESSURE: 109 MMHG | WEIGHT: 134.04 LBS | DIASTOLIC BLOOD PRESSURE: 73 MMHG | OXYGEN SATURATION: 96 % | HEIGHT: 64 IN | TEMPERATURE: 97 F

## 2025-04-23 VITALS
BODY MASS INDEX: 22.33 KG/M2 | DIASTOLIC BLOOD PRESSURE: 69 MMHG | HEIGHT: 65 IN | WEIGHT: 134 LBS | SYSTOLIC BLOOD PRESSURE: 108 MMHG

## 2025-04-23 DIAGNOSIS — Z90.79 ACQUIRED ABSENCE OF OTHER GENITAL ORGAN(S): Chronic | ICD-10-CM

## 2025-04-23 DIAGNOSIS — I67.1 CEREBRAL ANEURYSM, NONRUPTURED: ICD-10-CM

## 2025-04-23 DIAGNOSIS — Z01.818 ENCOUNTER FOR OTHER PREPROCEDURAL EXAMINATION: ICD-10-CM

## 2025-04-23 DIAGNOSIS — Z98.890 OTHER SPECIFIED POSTPROCEDURAL STATES: ICD-10-CM

## 2025-04-23 DIAGNOSIS — Z90.49 ACQUIRED ABSENCE OF OTHER SPECIFIED PARTS OF DIGESTIVE TRACT: Chronic | ICD-10-CM

## 2025-04-23 DIAGNOSIS — Z90.5 ACQUIRED ABSENCE OF KIDNEY: Chronic | ICD-10-CM

## 2025-04-23 DIAGNOSIS — Z98.890 OTHER SPECIFIED POSTPROCEDURAL STATES: Chronic | ICD-10-CM

## 2025-04-23 LAB
ALBUMIN SERPL ELPH-MCNC: 3.9 G/DL — SIGNIFICANT CHANGE UP (ref 3.5–5.2)
ALP SERPL-CCNC: 100 U/L — SIGNIFICANT CHANGE UP (ref 30–115)
ALT FLD-CCNC: 15 U/L — SIGNIFICANT CHANGE UP (ref 0–41)
ANION GAP SERPL CALC-SCNC: 9 MMOL/L — SIGNIFICANT CHANGE UP (ref 7–14)
APTT BLD: 30.4 SEC — SIGNIFICANT CHANGE UP (ref 27–39.2)
AST SERPL-CCNC: 22 U/L — SIGNIFICANT CHANGE UP (ref 0–41)
BASOPHILS # BLD AUTO: 0.03 K/UL — SIGNIFICANT CHANGE UP (ref 0–0.2)
BASOPHILS NFR BLD AUTO: 0.3 % — SIGNIFICANT CHANGE UP (ref 0–1)
BILIRUB SERPL-MCNC: 0.4 MG/DL — SIGNIFICANT CHANGE UP (ref 0.2–1.2)
BUN SERPL-MCNC: 22 MG/DL — HIGH (ref 10–20)
CALCIUM SERPL-MCNC: 9.6 MG/DL — SIGNIFICANT CHANGE UP (ref 8.4–10.5)
CHLORIDE SERPL-SCNC: 102 MMOL/L — SIGNIFICANT CHANGE UP (ref 98–110)
CO2 SERPL-SCNC: 24 MMOL/L — SIGNIFICANT CHANGE UP (ref 17–32)
CREAT SERPL-MCNC: 1.4 MG/DL — SIGNIFICANT CHANGE UP (ref 0.7–1.5)
EGFR: 53 ML/MIN/1.73M2 — LOW
EGFR: 53 ML/MIN/1.73M2 — LOW
EOSINOPHIL # BLD AUTO: 0.2 K/UL — SIGNIFICANT CHANGE UP (ref 0–0.7)
EOSINOPHIL NFR BLD AUTO: 1.9 % — SIGNIFICANT CHANGE UP (ref 0–8)
GLUCOSE SERPL-MCNC: 94 MG/DL — SIGNIFICANT CHANGE UP (ref 70–99)
HCT VFR BLD CALC: 41.7 % — LOW (ref 42–52)
HGB BLD-MCNC: 13.5 G/DL — LOW (ref 14–18)
IMM GRANULOCYTES NFR BLD AUTO: 0.3 % — SIGNIFICANT CHANGE UP (ref 0.1–0.3)
INR BLD: 0.94 RATIO — SIGNIFICANT CHANGE UP (ref 0.65–1.3)
LYMPHOCYTES # BLD AUTO: 1.79 K/UL — SIGNIFICANT CHANGE UP (ref 1.2–3.4)
LYMPHOCYTES # BLD AUTO: 16.7 % — LOW (ref 20.5–51.1)
MCHC RBC-ENTMCNC: 28.8 PG — SIGNIFICANT CHANGE UP (ref 27–31)
MCHC RBC-ENTMCNC: 32.4 G/DL — SIGNIFICANT CHANGE UP (ref 32–37)
MCV RBC AUTO: 88.9 FL — SIGNIFICANT CHANGE UP (ref 80–94)
MONOCYTES # BLD AUTO: 0.77 K/UL — HIGH (ref 0.1–0.6)
MONOCYTES NFR BLD AUTO: 7.2 % — SIGNIFICANT CHANGE UP (ref 1.7–9.3)
NEUTROPHILS # BLD AUTO: 7.92 K/UL — HIGH (ref 1.4–6.5)
NEUTROPHILS NFR BLD AUTO: 73.6 % — SIGNIFICANT CHANGE UP (ref 42.2–75.2)
NRBC BLD AUTO-RTO: 0 /100 WBCS — SIGNIFICANT CHANGE UP (ref 0–0)
PLATELET # BLD AUTO: 306 K/UL — SIGNIFICANT CHANGE UP (ref 130–400)
PMV BLD: 9.3 FL — SIGNIFICANT CHANGE UP (ref 7.4–10.4)
POTASSIUM SERPL-MCNC: 4 MMOL/L — SIGNIFICANT CHANGE UP (ref 3.5–5)
POTASSIUM SERPL-SCNC: 4 MMOL/L — SIGNIFICANT CHANGE UP (ref 3.5–5)
PROT SERPL-MCNC: 7.4 G/DL — SIGNIFICANT CHANGE UP (ref 6–8)
PROTHROM AB SERPL-ACNC: 11.1 SEC — SIGNIFICANT CHANGE UP (ref 9.95–12.87)
RBC # BLD: 4.69 M/UL — LOW (ref 4.7–6.1)
RBC # FLD: 13.2 % — SIGNIFICANT CHANGE UP (ref 11.5–14.5)
SODIUM SERPL-SCNC: 135 MMOL/L — SIGNIFICANT CHANGE UP (ref 135–146)
WBC # BLD: 10.74 K/UL — SIGNIFICANT CHANGE UP (ref 4.8–10.8)
WBC # FLD AUTO: 10.74 K/UL — SIGNIFICANT CHANGE UP (ref 4.8–10.8)

## 2025-04-23 PROCEDURE — 85610 PROTHROMBIN TIME: CPT

## 2025-04-23 PROCEDURE — 85025 COMPLETE CBC W/AUTO DIFF WBC: CPT

## 2025-04-23 PROCEDURE — 85730 THROMBOPLASTIN TIME PARTIAL: CPT

## 2025-04-23 PROCEDURE — 99204 OFFICE O/P NEW MOD 45 MIN: CPT

## 2025-04-23 PROCEDURE — 36415 COLL VENOUS BLD VENIPUNCTURE: CPT

## 2025-04-23 PROCEDURE — 99214 OFFICE O/P EST MOD 30 MIN: CPT | Mod: 25

## 2025-04-23 PROCEDURE — 93010 ELECTROCARDIOGRAM REPORT: CPT

## 2025-04-23 PROCEDURE — 93005 ELECTROCARDIOGRAM TRACING: CPT

## 2025-04-23 PROCEDURE — 80053 COMPREHEN METABOLIC PANEL: CPT

## 2025-04-23 NOTE — H&P PST ADULT - NSICDXPASTMEDICALHX_GEN_ALL_CORE_FT
PAST MEDICAL HISTORY:  Brain aneurysm     Cancer kidney    COPD (chronic obstructive pulmonary disease)     ED (erectile dysfunction)     HLD (hyperlipidemia)     Prostate disease

## 2025-04-23 NOTE — H&P PST ADULT - BP NONINVASIVE MEAN (MM HG)
How Severe Is Your Skin Lesion?: moderate Has Your Skin Lesion Been Treated?: not been treated Is This A New Presentation, Or A Follow-Up?: Skin Lesion Which Family Member (Optional)?: Brother 85

## 2025-04-23 NOTE — H&P PST ADULT - HISTORY OF PRESENT ILLNESS
Patient is a 74  year old  male presenting to PAST in preparation for  CEREBRAL ANGIOGRAM  on  4/29  under LSB anesthesia by Dr. Miranda .  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 He has agreed to undergo an updated DSA to gain further insight regarding aneurysm.  He recently presented to his neurologist for persistent frontal headaches that he states have been ongoing for the last 3 to 6 months. He also had headaches prior to the angiogram in 2021 but states that they are stronger in nature now.     PATIENT/ Guardian CURRENTLY DENIES CHEST PAIN  SHORTNESS OF BREATH  PALPITATIONS,  DYSURIA, OR UPPER RESPIRATORY INFECTION IN PAST 2 WEEKS  denies travel outside the [place] in the past 30 days    Anesthesia Alert  NO--Difficult Airway  NO--History of neck surgery or radiation  NO--Limited ROM of neck  NO--History of Malignant hyperthermia  NO--No personal or family history of Pseudocholinesterase deficiency.  NO--Prior Anesthesia Complication  NO--Latex Allergy  NO--Loose teeth  NO--History of Rheumatoid Arthritis  NO--Bleeding risk  NO--GREG  NO--Other_____    PT/Guardian DENIES ANY RASHES, ABRASION, OR OPEN WOUNDS OR CUTS    AS PER THE PT/Guardian, THIS IS HIS/HER COMPLETE MEDICAL AND SURGICAL HX, INCLUDING MEDICATIONS PRESCRIBED AND OVER THE COUNTER    Patient/ Guardian verbalized understanding of instructions and was given the opportunity to ask questions and have them answered.    pt/ Guardian denies any suicidal ideation or thoughts, pt states not a threat to self or others    Revised Cardiac Risk Index for Pre-Operative Risk from [employer]  on 4/23/2025  ** All calculations should be rechecked by clinician prior to use **    RESULT SUMMARY:  0 points  RCRI Score    3.9 %  Risk of major cardiac event      INPUTS:  High-risk surgery —> 0 = No  History of ischemic heart disease —> 0 = No  History of congestive heart failure —> 0 = No  History of cerebrovascular disease —> 0 = No  Pre-operative treatment with insulin —> 0 = No  Pre-operative creatinine >2 mg/dL / 176.8 µmol/L —> 0 = No      Duke Activity Status Index (DASI) from [employer]  on 4/23/2025  ** All calculations should be rechecked by clinician prior to use **    RESULT SUMMARY:  30.2 points  The higher the score (maximum 58.2), the higher the functional status.    6.45 METs      INPUTS:  Take care of self —> 2.75 = Yes  Walk indoors —> 1.75 = Yes  Walk 1&ndash;2 blocks on level ground —> 2.75 = Yes  Climb a flight of stairs or walk up a hill —> 5.5 = Yes  Run a short distance —> 0 = No  Do light work around the house —> 2.7 = Yes  Do moderate work around the house —> 3.5 = Yes  Do heavy work around the house —> 0 = No  Do yardwork —> 0 = No  Have sexual relations —> 5.25 = Yes  Participate in moderate recreational activities —> 6 = Yes  Participate in strenuous sports —> 0 = No

## 2025-04-24 DIAGNOSIS — Z01.818 ENCOUNTER FOR OTHER PREPROCEDURAL EXAMINATION: ICD-10-CM

## 2025-04-24 DIAGNOSIS — I67.1 CEREBRAL ANEURYSM, NONRUPTURED: ICD-10-CM

## 2025-04-25 PROBLEM — E78.5 HYPERLIPIDEMIA, UNSPECIFIED: Chronic | Status: ACTIVE | Noted: 2025-04-23

## 2025-04-29 ENCOUNTER — TRANSCRIPTION ENCOUNTER (OUTPATIENT)
Age: 75
End: 2025-04-29

## 2025-04-29 ENCOUNTER — APPOINTMENT (OUTPATIENT)
Dept: NEUROSURGERY | Facility: HOSPITAL | Age: 75
End: 2025-04-29

## 2025-04-29 ENCOUNTER — RESULT REVIEW (OUTPATIENT)
Age: 75
End: 2025-04-29

## 2025-04-29 ENCOUNTER — OUTPATIENT (OUTPATIENT)
Dept: OUTPATIENT SERVICES | Facility: HOSPITAL | Age: 75
LOS: 1 days | Discharge: HOME | End: 2025-04-29
Payer: MEDICARE

## 2025-04-29 VITALS
HEIGHT: 65 IN | WEIGHT: 136.03 LBS | DIASTOLIC BLOOD PRESSURE: 71 MMHG | HEART RATE: 71 BPM | RESPIRATION RATE: 18 BRPM | SYSTOLIC BLOOD PRESSURE: 103 MMHG | OXYGEN SATURATION: 96 % | TEMPERATURE: 98 F

## 2025-04-29 VITALS
RESPIRATION RATE: 17 BRPM | HEART RATE: 74 BPM | OXYGEN SATURATION: 97 % | SYSTOLIC BLOOD PRESSURE: 99 MMHG | DIASTOLIC BLOOD PRESSURE: 64 MMHG

## 2025-04-29 DIAGNOSIS — I67.1 CEREBRAL ANEURYSM, NONRUPTURED: ICD-10-CM

## 2025-04-29 DIAGNOSIS — Z98.890 OTHER SPECIFIED POSTPROCEDURAL STATES: Chronic | ICD-10-CM

## 2025-04-29 DIAGNOSIS — Z90.5 ACQUIRED ABSENCE OF KIDNEY: Chronic | ICD-10-CM

## 2025-04-29 DIAGNOSIS — Z90.79 ACQUIRED ABSENCE OF OTHER GENITAL ORGAN(S): Chronic | ICD-10-CM

## 2025-04-29 DIAGNOSIS — Z90.49 ACQUIRED ABSENCE OF OTHER SPECIFIED PARTS OF DIGESTIVE TRACT: Chronic | ICD-10-CM

## 2025-04-29 DIAGNOSIS — R51.9 HEADACHE, UNSPECIFIED: ICD-10-CM

## 2025-04-29 PROCEDURE — 76937 US GUIDE VASCULAR ACCESS: CPT

## 2025-04-29 PROCEDURE — 76937 US GUIDE VASCULAR ACCESS: CPT | Mod: 26

## 2025-04-29 PROCEDURE — C1894: CPT

## 2025-04-29 PROCEDURE — C1887: CPT

## 2025-04-29 PROCEDURE — 76377 3D RENDER W/INTRP POSTPROCES: CPT | Mod: 26

## 2025-04-29 PROCEDURE — 36224 PLACE CATH CAROTD ART: CPT | Mod: RT

## 2025-04-29 RX ORDER — FLUTICASONE FUROATE, UMECLIDINIUM BROMIDE AND VILANTEROL TRIFENATATE 100; 62.5; 25 UG/1; UG/1; UG/1
0 POWDER RESPIRATORY (INHALATION)
Refills: 0 | DISCHARGE

## 2025-04-29 RX ORDER — GABAPENTIN 400 MG/1
1 CAPSULE ORAL
Refills: 0 | DISCHARGE

## 2025-04-29 RX ORDER — ALBUTEROL SULFATE 2.5 MG/3ML
0 VIAL, NEBULIZER (ML) INHALATION
Refills: 0 | DISCHARGE

## 2025-04-29 RX ORDER — ACETAMINOPHEN 500 MG/5ML
650 LIQUID (ML) ORAL EVERY 6 HOURS
Refills: 0 | Status: DISCONTINUED | OUTPATIENT
Start: 2025-04-29 | End: 2025-04-29

## 2025-04-29 NOTE — BRIEF OPERATIVE NOTE - COMMENTS
Please follow post NI orders for neuro checks, distal pulses, vitals, and arteriotomy site checks placed for total of 2 hour recovery period following procedure.   Keep HOB elevated, right wrist straight and immobile for x 2 hr    Keep IVF as ordered.   -140      Patient tolerated procedure well, hemodynamically stable, no change in neurological status compared to baseline.   NIHSS pre procedure _0_ post procedure _0_  Right wrist site CDI without evidence of bleeding hematoma oozing ecchymosis swelling at the time of closure. Site nontender to palpation. Temperature and color consistent bilaterally to palpation with intact distal pulses.     Please notify provider with any signs of bleeding or hematoma at arteriotomy site, change in mental status, vitals outside parameters, or absent distal pulses.   [id number]      Please follow post NI orders for neuro checks, distal pulses, vitals, and arteriotomy site checks placed for total of 2 hour recovery period following procedure.   Keep HOB elevated, right wrist straight and immobile for x 2 hr    Keep IVF as ordered.   -140      Patient tolerated procedure well, hemodynamically stable, no change in neurological status compared to baseline.   NIHSS pre procedure _0_ post procedure _0_  Right wrist site CDI without evidence of bleeding hematoma oozing ecchymosis swelling at the time of closure. Site nontender to palpation. Temperature and color consistent bilaterally to palpation with intact distal pulses.     Please notify provider with any signs of bleeding or hematoma at arteriotomy site, change in mental status, vitals outside parameters, or absent distal pulses.   [id number]   Post op hypotension in recovery responding to IV fluid bolus and positioning.

## 2025-04-29 NOTE — ASU PATIENT PROFILE, ADULT - SURGICAL SITE INCISION
Progress  Note  Chief Complaint   Patient presents with    Lab result      Gets blood transfusions once a month. Hgb at [facility] was 6.9. is on 2L NC at baseline. +thinners      Historical Issues:  Current Facility-Administered Medications   Medication Dose Route Frequency Provider Last Rate Last Admin    zinc sulfate (ZINCATE) 220 mg capsule - elemental zinc 50 mg  50 mg Oral Daily Sven Dyer MD   50 mg at 04/10/24 0819    0.9 % sodium chloride infusion   IntraVENous PRN Hrkendra, Abdelrahman, DO        sodium chloride flush 0.9 % injection 5-40 mL  5-40 mL IntraVENous 2 times per day Sven Dyer MD   10 mL at 04/10/24 0819    sodium chloride flush 0.9 % injection 5-40 mL  5-40 mL IntraVENous PRN Sven Dyer MD        0.9 % sodium chloride infusion   IntraVENous PRN Sven Dyer MD        0.9 % sodium chloride infusion   IntraVENous PRN Sven Dyer MD        guaiFENesin tablet 400 mg  400 mg Oral 4x Daily PRN Marian Jackson APRN - NP   400 mg at 04/10/24 0044    ipratropium 0.5 mg-albuterol 2.5 mg (DUONEB) nebulizer solution 1 Dose  1 Dose Inhalation Q4H PRN Marian Jackson APRN - NP   1 Dose at 04/09/24 2251    ipratropium 0.5 mg-albuterol 2.5 mg (DUONEB) nebulizer solution 1 Dose  1 Dose Inhalation BID Sven Dyer MD   1 Dose at 04/09/24 1817    atorvastatin (LIPITOR) tablet 40 mg  40 mg Oral Nightly Sven Dyer MD   40 mg at 04/09/24 2125    brinzolamide-brimonidine 1-0.2 % SUSP 1 drop (Patient Supplied)  1 drop Both Eyes BID Sven Dyer MD        vitamin B-12 (CYANOCOBALAMIN) tablet 1,000 mcg  1,000 mcg Oral Daily Sven Dyer MD   1,000 mcg at 04/10/24 0820    finasteride (PROSCAR) tablet 5 mg  5 mg Oral Daily Sven Dyer MD   5 mg at 04/10/24 0819    latanoprost (XALATAN) 0.005 % ophthalmic solution 1 drop  1 drop Both Eyes Nightly Sven Dyer MD   1 drop at 04/09/24 2126    lidocaine (LMX) 4 % cream   Topical Daily PRN Sven Dyer MD        cetirizine (ZYRTEC)  no Nursing reports that he was unable to sleep the night prior.  He did have his procedure today.    His heart is rate controlled.  Lungs are noted to be clear to auscultation.  There is a considerable amount of fluid that was removed during procedure today.  Extremities still have edema.  Abdomen is unremarkable  Neurologically he is without deficit  Recent Labs     04/08/24  0120      K 3.9      CO2 25   BUN 27*   CREATININE 2.0*   GLUCOSE 98   CALCIUM 9.2     Recent Labs     04/09/24  1400 04/09/24  2115 04/10/24  0412 04/10/24  1055   WBC 5.3  --   --   --    RBC 2.30*  --   --   --    HGB 7.0* 7.4* 7.5* 8.0*   HCT 22.8* 23.5* 23.6* 25.7*   MCV 99.1  --   --   --    MCH 30.4  --   --   --    MCHC 30.7*  --   --   --    RDW 15.5*  --   --   --      --   --   --    MPV 10.7  --   --   --            Principal Problem:    Acute upper GI bleed  Active Problems:    Coronary artery disease involving native coronary artery of native heart without angina pectoris    Renal cell cancer (HCC)    Severe protein-calorie malnutrition (HCC)    Acute blood loss anemia    CKD (chronic kidney disease)    Pleural effusion    ABLA (acute blood loss anemia)    Hypotension  Resolved Problems:    * No resolved hospital problems. *      Plan:    Blood and pleural fluid analysis are pending.  Cytology was also ordered with regards to this.  IVC filter is still in the plans  GI does not have intention to do any studies during this admission.      Case Discussed with:      Electronically signed by Sven Dyer MD on 4/10/2024 at 2:59 PM

## 2025-04-29 NOTE — ASU PATIENT PROFILE, ADULT - IS PATIENT PREGNANT?
[facility] GROUP HEMATOLOGY AND ONCOLOGY  [address]  [facility] [address] [phone]    Patient Name: Parker Arnold  Encounter Date: 01/19/2021  YOB: 1953  Patient Number: [id number]      REASON FOR VISIT: Parker Arnold is a 67 y.o. year old male with a diagnosis of clear cell renal cancer in 11/11/2014.  Underwent right nephrectomy. Was under surveillance until 11/26/2018  When he underwent  wedge resection of a right pulm nodule. Pathology:  metastatic renal cell carcinoma. He was not able to tolerate Sutent due to kidney failure, thus received ipilumomab/nivolumab, 05/13/2019 to 08/07/2019, then single agent nivolumab, 08/28/2019 to 09/09/2019 (16.5 months since cessation of therapy). Therapy stopped due to renal failure. He has been on and off prednisone since.    I have reviewed the HPI and verified with the patient the accuracy of it. No changes to interval history since the information was documented. Leonardo Smith MD 01/19/21      Problem List Items Addressed This Visit        Other    Renal cell carcinoma of right kidney (CMS/HCC) - Primary    Overview     Overview:   Diagnoses 11/11/14. Path report in C.E at [facility]. Tissue has been requested.          Relevant Orders    Ambulatory Referral to Hematology / Oncology (Completed)    NM Pet Skull Base To Mid Thigh (Completed)    CT chest wo contrast    CT abdomen pelvis wo contrast    Comprehensive Metabolic Panel    CBC & Differential        Oncology/Hematology History   Renal cell carcinoma of right kidney (CMS/HCC)   10/2014 Initial Diagnosis    Renal cell carcinoma (CMS/HCC)     10/2014 Surgery    Right nephrectomy, Dr Morales       10/1/2018 Progression    Increasing lung Nodules on f/u CT     10/2/2018 -  Chemotherapy    Sutent  Discontinued due to intolerance     5/13/2019 - 8/7/2019 Chemotherapy    Opdivo Yervoy x 4 cycles      8/28/2019 - 9/9/2019 Chemotherapy    Single agent Opdivo    Opdivo held 9/9/2019 due to Renal Failure / NephritisToxicity     1/16/2020 Imaging    PET SCAN  Negative for neoplastic uptake         PAST MEDICAL HISTORY:  ALLERGIES:  Allergies   Allergen Reactions   • Amoxicillin Hives   • Nivolumab Provider Review Needed     Renal failure   • Penicillins Hives and Rash   • Clindamycin/Lincomycin Rash     pustules  Rash - pustules     • Doxycycline Rash   • Hepatitis B Virus Vaccines Rash     Pustules     • Hepatitis B Vaccine Rash   • Latex Rash   • Levofloxacin Rash     CURRENT MEDICATIONS:  Outpatient Encounter Medications as of 1/19/2021   Medication Sig Dispense Refill   • calcitriol (ROCALTROL) 0.25 MCG capsule Take 1 capsule by mouth Daily. 30 capsule 2   • calcium carb-cholecalciferol (Caltrate 600+D3) 600-800 MG-UNIT tablet Take 1 tablet by mouth 2 (Two) Times a Day. 60 tablet 3   • carvedilol (COREG) 12.5 MG tablet Take 12.5 mg by mouth 2 (Two) Times a Day With Meals.     • finasteride (PROSCAR) 5 MG tablet Take 5 mg by mouth Daily.  3   • pantoprazole (PROTONIX) 40 MG EC tablet Take 1 tablet by mouth Daily. 30 tablet 2   • predniSONE (DELTASONE) 5 MG tablet Take 5 mg by mouth Daily.     • sodium bicarbonate 650 MG tablet Take 1 tablet by mouth 3 (Three) Times a Day. 90 tablet 2   • [DISCONTINUED] ondansetron (ZOFRAN) 8 MG tablet Take 8 mg by mouth Every 8 (Eight) Hours.       No facility-administered encounter medications on file as of 1/19/2021.      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   • TIA (transient ischemic attack) G45.9   • Acute renal failure with tubular necrosis in the setting of solitary kidney N17.0   • Lung metastases (CMS/HCC) C78.00   • Pyuria R82.81   • Hyperkalemia E87.5   • Metabolic acidosis E87.2   • Hyperglycemia R73.9   • Hypercalcemia E83.52    • Benign prostatic hyperplasia N40.0   • Increased frequency of urination R35.0   • Nocturia R35.1   • Retention of urine R33.9   • Dehydration E86.0   • Nephritis and nephropathy N05.9   • Toxicity, chemicals T65.91XA   • Stage 3 chronic kidney disease (CMS/HCC) N18.30   • Campylobacter gastroenteritis A04.5   • Sigmoid diverticulitis K57.32     SURGERIES:  Past Surgical History:   Procedure Laterality Date   • HERNIA REPAIR     • LUNG BIOPSY  2018   • NEPHRECTOMY     • SINUS SURGERY       HEALTH MAINTENANCE ITEMS:  Health Maintenance Due   Topic Date Due   • COLONOSCOPY  1953   • TDAP/TD VACCINES (1 - Tdap) 1973   • ZOSTER VACCINE (1 of 2) 2004   • Pneumococcal Vaccine 65+ (1 of 1 - PPSV23) 2019   • HEPATITIS C SCREENING  2019   • ANNUAL WELLNESS VISIT  2019   • INFLUENZA VACCINE  2020       <no information>  Last Completed Colonoscopy       Status Date      COLONOSCOPY No completions recorded          There is no immunization history on file for this patient.  Last Completed Mammogram     Patient has no health maintenance due at this time            FAMILY HISTORY:  Family History   Problem Relation Age of Onset   • Other Mother         blood disease unknown   • Cancer Father         unknown   • No Known Problems Sister    • Heart disease Maternal Grandmother    • Heart disease Maternal Grandfather    • No Known Problems Paternal Grandmother    • No Known Problems Paternal Grandfather    • No Known Problems Sister      SOCIAL HISTORY:  Social History     Socioeconomic History   • Marital status:      Spouse name: Not on file   • Number of children: Not on file   • Years of education: Not on file   • Highest education level: Not on file   Tobacco Use   • Smoking status: Former Smoker     Packs/day: 1.00     Years: 15.00     Pack years: 15.00     Types: Cigarettes     Quit date:      Years since quittin.0   • Smokeless tobacco: Never Used  "  Substance and Sexual Activity   • Alcohol use: No     Frequency: Never   • Drug use: No   • Sexual activity: Defer       REVIEW OF SYSTEMS:  Constitutional: Manages ADLs, chores, errands and driving.  Drove himself in today.  Appetite is good.  \"Yes.\" Energy is fair.  \"Some days better than others.\"  He has gained 8 pounds since his prior visit. No fever, no chills nor drenching nights.  HENT: Negative.  No sore throat.  Has seasonal sinus symptoms/rhinorrhea. \"Some.\" No headaches.  Eyes: Negative.    Respiratory:  No SOB at rest nor TENORIO.  No cough. No wheezing. No tobacco use.    Cardiovascular: Negative.  No chest pain.  No palpitations.  No orthopnea  Gastrointestinal: No dysphagia.  No nausea.  No vomiting.  No dyspepsia.  Infrequent constipation, does not need stool softeners sometimes.  No diarrhea.  No melena. No hematochezia. No recurrent diverticulitis since 07/2020.  Endocrine: No hot flashes.  Genitourinary:  No dysuria.  No incontinence. No hematuria.  Occasional urgency.  Nocturia much improved on Finasteride.  Musculoskeletal:  No arthralgias.  No edema  Skin: No new rash.    Neurological:  No dizziness.  No facial asymmetry. No headaches.  Mild sensory neuropathy of the hands.  Hematological: Negative for new adenopathy.  Says he bruises easily.  Psychiatric/Behavioral:  No anxiety.  No depression.      VITAL SIGNS: /100   Pulse 58   Temp 97.4 °F (36.3 °C)   Resp 16   Ht 177.8 cm (70\")   Wt 95.7 kg (211 lb)   SpO2 93%   BMI 30.28 kg/m² Body surface area is 2.14 meters squared.  Pain Score    01/19/21 1020   PainSc: 0-No pain         PHYSICAL EXAMINATION:   General Appearance: Patient is a pleasant, cooperative, obese, modestly kept male who is awake, alert, oriented and in no acute distress. ECOG 1  HEENT: Normocephalic. Sclerae clear, conjunctiva pink, extraocular movements intact, pupils, round, reactive to light and  accommodation. Is wearing a surgical mask today  NECK: Supple, no " jugular venous distention, thyroid not enlarged.  LYMPH: No cervical, supraclavicular, axillary, or inguinal lymphadenopathy.  LUNGS: Good air movement, no rales, rhonchi, rubs or wheezes with auscultation  CARDIO: Regular sinus rhythm, no murmurs, gallops or rubs.  ABDOMEN:  Obese, nondistended, soft, No tenderness, no guarding, no rebound, No hepatosplenomegaly. No abdominal masses. Bowel sounds positive. No hernia  MUSKEL: No joint swelling, decreased motion, or inflammation  EXTREMS:  No edema, clubbing, cyanosis, No varicose veins.  NEURO: Grossly nonfocal, Gait is coordinated and smooth, Cognition is preserved.  SKIN: No rashes, no ecchymoses, no petechia.  PSYCH: Oriented to time, place and person. Memory is preserved. Mood and affect appear normal         LABS    Lab Results - Last 18 Months   Lab Units 01/12/21  1337 12/28/20  0912 10/21/20  1228 07/24/20  1033 07/08/20  0509 07/07/20  0457  04/24/20  1037 02/24/20  0945 01/23/20  0851   HEMOGLOBIN g/dL 14.1 13.5* 13.4 12.2* 10.2* 11.4*   < > 11.1* 12.9* 11.2*   HEMATOCRIT % 40.7 42.3 39.8 36.7* 31.5* 34.3*   < > 34.0* 37.9 34.1*   MCV fL 87.2 91.4 86.3 88.0 91.8 90.7   < > 90.9 92.9 95.5   WBC 10*3/mm3 6.63 7.6 6.33 7.36 6.3 7.7   < > 8.67 9.99 11.75*   RDW % 13.4 13.5 13.2 14.1 13.4 13.4   < > 13.5 13.2 14.4   MPV fL 9.2 9.5 9.5 9.1 9.5 9.6   < > 9.3 9.4 10.1   PLATELETS 10*3/mm3 175 170 219 237 140 151   < > 172 174 175   IMM GRAN % % 0.5  --  1.1* 1.2*  --   --   --  1.2* 1.0* 0.9*   NEUTROS ABS 10*3/mm3 4.01 5.0 3.84 4.73 4.2 5.7   < > 6.65 8.22* 9.52*   LYMPHS ABS 10*3/mm3 1.59 1.6 1.59 1.54 1.2 1.2   < > 1.03 0.93 1.35   MONOS ABS 10*3/mm3 0.81 0.80 0.62 0.79 0.70 0.70   < > 0.57 0.66 0.75   EOS ABS 10*3/mm3 0.15 0.20 0.17 0.15 0.20 0.10   < > 0.29 0.06 0.02   BASOS ABS 10*3/mm3 0.04 0.00 0.04 0.06 0.00 0.00   < > 0.03 0.02 0.01   IMMATURE GRANS (ABS) 10*3/mm3 0.03 0.0 0.07* 0.09* 0.0 0.0   < > 0.10* 0.10* 0.10*   NRBC /100 WBC 0.0  --  0.0 0.0  --    --   --  0.0 0.0 0.0    < > = values in this interval not displayed.       Lab Results - Last 18 Months   Lab Units 01/12/21  1337 12/28/20  0912 10/21/20  1228 07/24/20  1033 07/07/20  1028 04/24/20  1037 02/24/20  0945 01/23/20  0851   GLUCOSE mg/dL 77 105 104* 100*  --  128* 96 120*   SODIUM mmol/L 141 140 142 140  --  141 141 141   POTASSIUM mmol/L 4.6 4.6 4.6 4.8 4.4 4.7 4.9 4.1   TOTAL CO2 mmol/L  --  25  --   --   --   --   --   --    CO2 mmol/L 27.0  --  22.0 23.0  --  21.0* 23.0 26.0   CHLORIDE mmol/L 106 105 111* 105  --  107 107 104   ANION GAP mmol/L 8.0 10 9.0 12.0  --  13.0 11.0 11.0   CREATININE mg/dL 3.50* 3.2* 3.55* 3.32*  --  3.61* 2.93* 2.83*   BUN mg/dL 48* 43* 50* 51*  --  50* 53* 57*   BUN / CREAT RATIO  13.7  --  14.1 15.4  --  13.9 18.1 20.1   CALCIUM mg/dL 9.5 9.6 9.9 9.7  --  9.4 9.8 9.5   EGFR IF NONAFRICN AM mL/min/1.73 18* 19* 17* 19*  --  17* 22* 23*   ALK PHOS U/L 73 74 62 52  --  48 39 43   TOTAL PROTEIN g/dL 6.8 6.5* 6.4 6.4  --  6.0 6.3 6.2   ALT (SGPT) U/L 12 13 10 10  --  11 14 32   AST (SGOT) U/L 14 12 14 11  --  10 12 8   BILIRUBIN mg/dL 0.3 0.4 0.3 0.4  --  0.3 0.4 0.4   ALBUMIN g/dL 4.30 4.1 4.40 4.20  --  3.70 4.10 4.10   GLOBULIN gm/dL 2.5  --  2.0 2.2  --  2.3 2.2 2.1       Lab Results - Last 18 Months   Lab Units 12/28/20  0912 07/06/20  1035 01/05/20  0520 01/02/20  0824 10/19/19  0627 10/18/19  0739   URIC ACID mg/dL 8.8* 9.9* 4.7 4.6 3.5 3.9       Lab Results - Last 18 Months   Lab Units 07/08/20  0509 04/24/20  1037 10/19/19  0627   IRON ug/dL 60 65  --    TIBC ug/dL 202* 262*  --    IRON SATURATION % 30 25  --    FERRITIN ng/mL  --  292.70  --    TSH uIU/mL  --   --  0.367       Assessment:  1.  Renal cell carcinoma, diagnosed 11/11/2014  -2014, right nephrectomy.  -11/26/2018-right lower lobe VATS wedge resection: Metastatic renal cell carcinoma, 0.9 cm; margins free of malignancy. Dr. Hu  2.  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  GFR 18 mL/min, 01/12/2021  (prior:  9 - 28)  --s/p multiple doses of HD steroids in the past and remains on daily 5 mg prednisone per nephrology (Dr. Grant)  --will not start cytoxan unless biopsy done to determine etiology of kidney failure. Has been off Opdivo since September 2019.  --01/16/2020-PET scan.  No neoplastic FDG uptake within the chest, abdomen or pelvis.  --07/06/2020-CT abdomen/pelvis with diverticulosis of the colon.  Evidence of acute diverticulitis of the sigmoid colon and adjacent distal descending colon.  No free air or fluid seen.  1 cm noncalcified subpleural nodule in the left lower lobe.  Follow-up exam in 6 months recommended.  --08/04/2020-chest CT.  Scattered subcentimeter bilateral pulmonary nodules similar to 1/16/2020 with mild increase in the 5 mm left upper lobe nodule.  Continued follow-up recommended.  No pathologic lymphadenopathy.  Right nephrectomy.  --01/14/2020-CT chest.  Increased size of 1.2 cm right lower lobe superior segment pulmonary nodule (previously 0.9 cm).  Multiple other bilateral pulmonary nodules have not significantly changed in size.  Findings are in keeping with disease progression.  --01/14/2020-CT abdomen/pelvis.  No evidence of recurrent or metastatic disease in the abdomen or pelvis.    3.  Normocytic anemia.  Hgb 14.1; MCV 87.2, 01/12/2021 (prior: Hgb 10.2-13.4; MCV 86.3-95.5)  4.  Gerd: on omeprazole  5.  Osteopenia: on bola+vit D  6.  Acute diverticulitis.  Resolved.  CT abdomen/pelvis, 07/06/2020 with diverticulosis of the colon.  Evidence of acute diverticulitis of the sigmoid colon and adjacent distal descending colon.  No free air or fluid seen.  1 cm noncalcified subpleural nodule in the left lower lobe.  Follow-up exam in 6 months recommended.    Plan:  1.  Previously reviewed available medical records, to include available pathology and imaging studies.  Therapeutic history also reviewed.   2.  Apprised of labs, 01/12/2021 to include the resolution of anemia otherwise normal  CBC, depressed GFR (stable) otherwise normal calcium, normal potassium and normal LFTs.  3.  Apprised of CT scans, 01/14/2021 (above) with increased size of 1.2 cm right lower lobe superior segment pulmonary nodule (from 0.9 cm) - representing 33% growth -  otherwise stable.  4.  Schedule PET[facility] thighs at [facility]  5.  Appoint to  oncology at [facility]: Management recommendations/clinical trials?  6.  Continue management per primary care and other specialists.  7.  Return to office in 8 weeks with preoffice CT scans of the chest, abdomen/pelvis without IV contrast, CBC with differential and CMP.    I spent - 40 total minutes, face-to-face, caring for Parker ledezma.  Greater than 50% of this time involved counseling and/or coordination of care as documented within this note regarding the patient's illness(es), pros and cons of various treatment options, instructions and/or risk reduction.   not applicable (Male)

## 2025-04-29 NOTE — ASU DISCHARGE PLAN (ADULT/PEDIATRIC) - ASU DC SPECIAL INSTRUCTIONSFT
Patient was discharged post procedure without eventful recovery period.   Patient will follow with Dr. Miranda in 1-2 weeks post procedure to discuss results and plan for treatment.     Discharge instructions were given to the patient in the presence of nursing staff.     Puncture site precautions: Ok to shower and remove dressing 24 hrs post procedure and leave it open to air (do not apply any lotion / ointments to the site). It is preferable to shower and avoid soaking the puncture site in bath / swimming pool / hot tub for 3 days following the procedure.   Groin puncture site - no driving for 36 hr post procedure cleared to resume driving thereafter, no lifting greater than 10 lbs for about 3 days post procedure cleared to resume after.   Wrist puncture site - cleared to drive, do not lift anything greater than 10lbs for 1 week in the arm that was accessed.     What to expect:   It is normal to experience some mild/mod discomfort at the puncture site that usually settles within 24-48 hr after the procedure as well as some superficial brising and skin discoloration. It is also common to experience a mild self resolvign headache during this time period that responds to tylenol / over the counter meds.     When to call the office / seek medical attention:   Bleeding - at the groin site, apply continuous manual pressure and seek medical attention   Swelling - or hard bump larger than the size of a nickel / small tangerine develops at the puncture site.   Discoloration / cold / numbness / tingling - in foot / leg or hand   Fever > 101.5F   Pain - especially if sudden or feeling of giving way at the puncture site   Symptoms of stroke - visual disturbances, face arm leg weakness, confusion, speech disturbance, drowsiness, persistent headache.     Office #[phone]

## 2025-04-29 NOTE — BRIEF OPERATIVE NOTE - OPERATION/FINDINGS
Procedure : Diagnostic cerebral angiogram     Contrast: Visipaque 320   IA medications: Heparin 3000 IU, Verapamil 2.5mg, Nitroglycerin 200 mcg   Implants placed: n/a  Complications : n/a    Preliminary Report:  Selective diagnostic angiogram performed from right radial arteriotomy site with preliminary findings of right MCA bifurcation fusiform aneurysm.     Official IR neuro procedure note to follow.

## 2025-04-29 NOTE — PRE PROCEDURE NOTE - PRE PROCEDURE EVALUATION
Interventional Neuro Radiology  Pre-Procedure Note PA-C    HPI:  The patient is a 74-year-old male smoker with a past medical history of BPH, chronic bronchitis, CKD, s/p left nephrectomy, severe COPD, urinary retention, and a known prior history of intracranial aneurysms. A right-sided, unruptured M1 aneurysm was clipped by Dr Familia Hall in 2,000, and the patient underwent a diagnostic cerbral angiogram 10/14/2021 on which a 4mm x 10 mm fusiform, right-sided Middle Cerebral Artery bifurcation aneurysm was observed. The patient has been having frontal headaches x 3-6 months and recently had a CTA 2/13/2025 on which no change in size of the right MCA aneurysm was reported (it was measured to be 1cm). The patient presents today for a repeat diagnostic cerebral angiogram to obtain better images of these aneurysms with a possible aim to treat the right MCA aneurysm with flow-diversion in the near future. NPO except medication after midnight last night. NIHSS 0 with a reported mild frontal headache.    Allergies: Percocet 10/325 (Other)  Toradol (Other (Mild))    PAST MEDICAL & SURGICAL HISTORY:  Cancer  kidney  Brain aneurysm  Prostate disease  COPD (chronic obstructive pulmonary disease)  HLD (hyperlipidemia)  ED (erectile dysfunction)  S/P TURP  H/O left nephrectomy  & 3 ribs  History of cholecystectomy  H/O hernia repair    FH: colon cancer  mother    FHx: multiple myeloma (Father)    FH: lupus (Sibling)    Assessment/Plan:   This is a 74-year-old male who presents for a diagnostic cerebral angiogram.  Procedure, goals, risks, benefits and alternatives  were discussed with patient and patient's family.  All questions were answered to best understanding.   Risks discussed include but are not limited to stroke, vessel injury, hemorrhage, and/or hematoma.  Patient demonstrates understanding  of all risks involved with this procedure and wishes to continue.     Appropriate consent was obtained from patient and consent is in the patient's chart.

## 2025-04-29 NOTE — ASU DISCHARGE PLAN (ADULT/PEDIATRIC) - FINANCIAL ASSISTANCE
[facility] provides services at a reduced cost to those who are determined to be eligible through [facility]’s financial assistance program. Information regarding [facility]’s financial assistance program can be found by going to [email or web address] or by calling [phone].

## 2025-04-29 NOTE — ASU DISCHARGE PLAN (ADULT/PEDIATRIC) - CARE PROVIDER_API CALL
Benjamin Miranda  Neurosurgery  [address] [place] [address], [address]  [place] [address]  Phone: [phone]  Fax: [phone]  Follow Up Time:

## 2025-06-20 NOTE — ED PROCEDURE NOTE - PROCEDURE DATE TIME, MLM
CARDIOLOGY OFFICE VISIT      Patient Name: Tana Cerda  Primary Care physician: Jeancarlos Boo MD    Reason for Referral/Chief Complaint: Tana Cerda is a 38 y.o. patient who is rpresenting to cardiology clinic today for office visit.     History of Present Illness:   Tana Cerda is a 38 y.o. woman who was admitted 4/7/25 for palpitations and chest pain.  Family member/significant other at bedside.  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  She experienced sudden onset of dizziness and palpitations and had to be helped by one of her colleagues.  A friend who is a nurse placed a pulse ox on her finger and noted elevated heart rates >220 bpm, so she presented to ED for further evaluation.  Patient reported significant family history of CAD and stroke in her father, mother, and 38 year old sister.  Of note, patient also with history of significant anemia that required hysterectomy due to abnormal vaginal bleeding.    Last office visit, 5/2/25, She reported she continued to experience intermittent episodes of dizziness, palpitations and chest pain. She reported there are no precipitating symptoms. Patient denied current edema, shortness of breath, palpitations, or syncope.   Patient was taking all cardiac medications as prescribed and tolerates them well.  She drinks about 100 oz of water daily.    In the interim, patient attempted to get CCTA but was unable to due to elevated HR.    Today, 6/20/25, she reports she is doing OK. She was unable to get CCTA completed due to elevated HR. 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 She continues to experience intermittent episodes of dizziness and palpitations.  Patient denies current edema, chest pain, shortness of breath, or syncope.      Past Medical History:   has a past medical history of Acne, Adjustment reaction with anxiety and depression, Allergic rhinitis, Anxiety, Dysmenorrhea, Latex allergy,  24-Jul-2022 22:56

## 2025-07-18 ENCOUNTER — APPOINTMENT (OUTPATIENT)
Dept: NEUROSURGERY | Facility: CLINIC | Age: 75
End: 2025-07-18

## 2025-07-18 VITALS
DIASTOLIC BLOOD PRESSURE: 70 MMHG | BODY MASS INDEX: 21.49 KG/M2 | SYSTOLIC BLOOD PRESSURE: 100 MMHG | HEIGHT: 65 IN | WEIGHT: 129 LBS

## 2025-07-18 PROCEDURE — 99213 OFFICE O/P EST LOW 20 MIN: CPT

## 2025-07-21 ENCOUNTER — APPOINTMENT (OUTPATIENT)
Dept: UROLOGY | Facility: CLINIC | Age: 75
End: 2025-07-21

## 2025-08-11 ENCOUNTER — APPOINTMENT (OUTPATIENT)
Dept: UROLOGY | Facility: CLINIC | Age: 75
End: 2025-08-11
Payer: MEDICARE

## 2025-08-11 VITALS
HEIGHT: 65 IN | OXYGEN SATURATION: 94 % | DIASTOLIC BLOOD PRESSURE: 65 MMHG | WEIGHT: 130 LBS | BODY MASS INDEX: 21.66 KG/M2 | HEART RATE: 78 BPM | SYSTOLIC BLOOD PRESSURE: 105 MMHG

## 2025-08-11 DIAGNOSIS — N13.8 BENIGN PROSTATIC HYPERPLASIA WITH LOWER URINARY TRACT SYMPMS: ICD-10-CM

## 2025-08-11 DIAGNOSIS — N40.1 BENIGN PROSTATIC HYPERPLASIA WITH LOWER URINARY TRACT SYMPMS: ICD-10-CM

## 2025-08-11 DIAGNOSIS — R97.20 ELEVATED PROSTATE, SPECIFIC ANTIGEN [PSA]: ICD-10-CM

## 2025-08-11 PROCEDURE — 99214 OFFICE O/P EST MOD 30 MIN: CPT

## 2025-08-15 ENCOUNTER — RX RENEWAL (OUTPATIENT)
Age: 75
End: 2025-08-15